# Patient Record
Sex: MALE | Race: WHITE | NOT HISPANIC OR LATINO | Employment: FULL TIME | URBAN - METROPOLITAN AREA
[De-identification: names, ages, dates, MRNs, and addresses within clinical notes are randomized per-mention and may not be internally consistent; named-entity substitution may affect disease eponyms.]

---

## 2024-06-25 ENCOUNTER — APPOINTMENT (OUTPATIENT)
Dept: ADMISSIONS | Facility: MEDICAL CENTER | Age: 39
End: 2024-06-25
Attending: SURGERY
Payer: COMMERCIAL

## 2024-06-28 ENCOUNTER — PRE-ADMISSION TESTING (OUTPATIENT)
Dept: ADMISSIONS | Facility: MEDICAL CENTER | Age: 39
End: 2024-06-28
Attending: SURGERY
Payer: COMMERCIAL

## 2024-06-28 VITALS — HEIGHT: 70 IN

## 2024-07-01 ENCOUNTER — ANESTHESIA EVENT (OUTPATIENT)
Dept: SURGERY | Facility: MEDICAL CENTER | Age: 39
End: 2024-07-01
Payer: COMMERCIAL

## 2024-07-01 ENCOUNTER — ANESTHESIA (OUTPATIENT)
Dept: SURGERY | Facility: MEDICAL CENTER | Age: 39
End: 2024-07-01
Payer: COMMERCIAL

## 2024-07-01 ENCOUNTER — HOSPITAL ENCOUNTER (OUTPATIENT)
Facility: MEDICAL CENTER | Age: 39
End: 2024-07-01
Attending: SURGERY | Admitting: SURGERY
Payer: COMMERCIAL

## 2024-07-01 VITALS
HEIGHT: 70 IN | BODY MASS INDEX: 24.78 KG/M2 | WEIGHT: 173.06 LBS | TEMPERATURE: 97.2 F | OXYGEN SATURATION: 94 % | RESPIRATION RATE: 18 BRPM | SYSTOLIC BLOOD PRESSURE: 153 MMHG | HEART RATE: 64 BPM | DIASTOLIC BLOOD PRESSURE: 95 MMHG

## 2024-07-01 DIAGNOSIS — G89.18 POSTOPERATIVE PAIN: ICD-10-CM

## 2024-07-01 LAB
ANION GAP SERPL CALC-SCNC: 13 MMOL/L (ref 7–16)
BUN SERPL-MCNC: 14 MG/DL (ref 8–22)
CALCIUM SERPL-MCNC: 9 MG/DL (ref 8.4–10.2)
CHLORIDE SERPL-SCNC: 99 MMOL/L (ref 96–112)
CO2 SERPL-SCNC: 21 MMOL/L (ref 20–33)
CREAT SERPL-MCNC: 0.79 MG/DL (ref 0.5–1.4)
ERYTHROCYTE [DISTWIDTH] IN BLOOD BY AUTOMATED COUNT: 35.3 FL (ref 35.9–50)
GFR SERPLBLD CREATININE-BSD FMLA CKD-EPI: 115 ML/MIN/1.73 M 2
GLUCOSE SERPL-MCNC: 279 MG/DL (ref 65–99)
HCT VFR BLD AUTO: 45.6 % (ref 42–52)
HGB BLD-MCNC: 16 G/DL (ref 14–18)
MCH RBC QN AUTO: 29.8 PG (ref 27–33)
MCHC RBC AUTO-ENTMCNC: 35.1 G/DL (ref 32.3–36.5)
MCV RBC AUTO: 84.9 FL (ref 81.4–97.8)
PLATELET # BLD AUTO: 306 K/UL (ref 164–446)
PMV BLD AUTO: 9 FL (ref 9–12.9)
POTASSIUM SERPL-SCNC: 4.2 MMOL/L (ref 3.6–5.5)
RBC # BLD AUTO: 5.37 M/UL (ref 4.7–6.1)
SODIUM SERPL-SCNC: 133 MMOL/L (ref 135–145)
WBC # BLD AUTO: 5.5 K/UL (ref 4.8–10.8)

## 2024-07-01 PROCEDURE — 160031 HCHG SURGERY MINUTES - 1ST 30 MINS LEVEL 5: Performed by: SURGERY

## 2024-07-01 PROCEDURE — 700111 HCHG RX REV CODE 636 W/ 250 OVERRIDE (IP): Performed by: ANESTHESIOLOGY

## 2024-07-01 PROCEDURE — 160002 HCHG RECOVERY MINUTES (STAT): Performed by: SURGERY

## 2024-07-01 PROCEDURE — 700101 HCHG RX REV CODE 250: Performed by: SURGERY

## 2024-07-01 PROCEDURE — 700111 HCHG RX REV CODE 636 W/ 250 OVERRIDE (IP): Mod: JZ | Performed by: ANESTHESIOLOGY

## 2024-07-01 PROCEDURE — 160025 RECOVERY II MINUTES (STATS): Performed by: SURGERY

## 2024-07-01 PROCEDURE — 160035 HCHG PACU - 1ST 60 MINS PHASE I: Performed by: SURGERY

## 2024-07-01 PROCEDURE — 700102 HCHG RX REV CODE 250 W/ 637 OVERRIDE(OP): Performed by: ANESTHESIOLOGY

## 2024-07-01 PROCEDURE — 85027 COMPLETE CBC AUTOMATED: CPT

## 2024-07-01 PROCEDURE — 700105 HCHG RX REV CODE 258: Performed by: ANESTHESIOLOGY

## 2024-07-01 PROCEDURE — 700101 HCHG RX REV CODE 250: Performed by: ANESTHESIOLOGY

## 2024-07-01 PROCEDURE — 36415 COLL VENOUS BLD VENIPUNCTURE: CPT

## 2024-07-01 PROCEDURE — 160046 HCHG PACU - 1ST 60 MINS PHASE II: Performed by: SURGERY

## 2024-07-01 PROCEDURE — 160042 HCHG SURGERY MINUTES - EA ADDL 1 MIN LEVEL 5: Performed by: SURGERY

## 2024-07-01 PROCEDURE — A9270 NON-COVERED ITEM OR SERVICE: HCPCS | Performed by: ANESTHESIOLOGY

## 2024-07-01 PROCEDURE — 160009 HCHG ANES TIME/MIN: Performed by: SURGERY

## 2024-07-01 PROCEDURE — 80048 BASIC METABOLIC PNL TOTAL CA: CPT

## 2024-07-01 PROCEDURE — C1781 MESH (IMPLANTABLE): HCPCS | Performed by: SURGERY

## 2024-07-01 PROCEDURE — 160036 HCHG PACU - EA ADDL 30 MINS PHASE I: Performed by: SURGERY

## 2024-07-01 PROCEDURE — 160048 HCHG OR STATISTICAL LEVEL 1-5: Performed by: SURGERY

## 2024-07-01 PROCEDURE — 502714 HCHG ROBOTIC SURGERY SERVICES: Performed by: SURGERY

## 2024-07-01 DEVICE — MESH PROGRIP LAPROSCOPIC SELF FIXATING (1/CA): Type: IMPLANTABLE DEVICE | Site: GROIN | Status: FUNCTIONAL

## 2024-07-01 RX ORDER — EPHEDRINE SULFATE 50 MG/ML
5 INJECTION, SOLUTION INTRAVENOUS
Status: DISCONTINUED | OUTPATIENT
Start: 2024-07-01 | End: 2024-07-01 | Stop reason: HOSPADM

## 2024-07-01 RX ORDER — KETOROLAC TROMETHAMINE 15 MG/ML
INJECTION, SOLUTION INTRAMUSCULAR; INTRAVENOUS PRN
Status: DISCONTINUED | OUTPATIENT
Start: 2024-07-01 | End: 2024-07-01 | Stop reason: SURG

## 2024-07-01 RX ORDER — HYDROMORPHONE HYDROCHLORIDE 2 MG/ML
INJECTION, SOLUTION INTRAMUSCULAR; INTRAVENOUS; SUBCUTANEOUS PRN
Status: DISCONTINUED | OUTPATIENT
Start: 2024-07-01 | End: 2024-07-01 | Stop reason: HOSPADM

## 2024-07-01 RX ORDER — HALOPERIDOL 5 MG/ML
1 INJECTION INTRAMUSCULAR
Status: DISCONTINUED | OUTPATIENT
Start: 2024-07-01 | End: 2024-07-01 | Stop reason: HOSPADM

## 2024-07-01 RX ORDER — OXYCODONE HYDROCHLORIDE 5 MG/1
5 TABLET ORAL EVERY 4 HOURS PRN
Qty: 12 TABLET | Refills: 0 | Status: SHIPPED | OUTPATIENT
Start: 2024-07-01 | End: 2024-07-04

## 2024-07-01 RX ORDER — OXYCODONE HCL 5 MG/5 ML
10 SOLUTION, ORAL ORAL
Status: COMPLETED | OUTPATIENT
Start: 2024-07-01 | End: 2024-07-01

## 2024-07-01 RX ORDER — HYDROMORPHONE HYDROCHLORIDE 1 MG/ML
0.2 INJECTION, SOLUTION INTRAMUSCULAR; INTRAVENOUS; SUBCUTANEOUS
Status: DISCONTINUED | OUTPATIENT
Start: 2024-07-01 | End: 2024-07-01 | Stop reason: HOSPADM

## 2024-07-01 RX ORDER — ONDANSETRON 2 MG/ML
4 INJECTION INTRAMUSCULAR; INTRAVENOUS
Status: DISCONTINUED | OUTPATIENT
Start: 2024-07-01 | End: 2024-07-01 | Stop reason: HOSPADM

## 2024-07-01 RX ORDER — POLYETHYLENE GLYCOL 3350 17 G/17G
17 POWDER, FOR SOLUTION ORAL DAILY
Qty: 20 EACH | Refills: 0 | Status: SHIPPED | OUTPATIENT
Start: 2024-07-01

## 2024-07-01 RX ORDER — HYDRALAZINE HYDROCHLORIDE 20 MG/ML
5 INJECTION INTRAMUSCULAR; INTRAVENOUS
Status: DISCONTINUED | OUTPATIENT
Start: 2024-07-01 | End: 2024-07-01 | Stop reason: HOSPADM

## 2024-07-01 RX ORDER — ACETAMINOPHEN 325 MG/1
650 TABLET ORAL EVERY 6 HOURS
Qty: 60 TABLET | Refills: 0 | Status: SHIPPED | OUTPATIENT
Start: 2024-07-01

## 2024-07-01 RX ORDER — DIPHENHYDRAMINE HYDROCHLORIDE 50 MG/ML
12.5 INJECTION INTRAMUSCULAR; INTRAVENOUS
Status: DISCONTINUED | OUTPATIENT
Start: 2024-07-01 | End: 2024-07-01 | Stop reason: HOSPADM

## 2024-07-01 RX ORDER — MEPERIDINE HYDROCHLORIDE 25 MG/ML
12.5 INJECTION INTRAMUSCULAR; INTRAVENOUS; SUBCUTANEOUS
Status: DISCONTINUED | OUTPATIENT
Start: 2024-07-01 | End: 2024-07-01 | Stop reason: HOSPADM

## 2024-07-01 RX ORDER — SODIUM CHLORIDE, SODIUM LACTATE, POTASSIUM CHLORIDE, CALCIUM CHLORIDE 600; 310; 30; 20 MG/100ML; MG/100ML; MG/100ML; MG/100ML
INJECTION, SOLUTION INTRAVENOUS
Status: DISCONTINUED | OUTPATIENT
Start: 2024-07-01 | End: 2024-07-01 | Stop reason: SURG

## 2024-07-01 RX ORDER — ONDANSETRON 2 MG/ML
INJECTION INTRAMUSCULAR; INTRAVENOUS PRN
Status: DISCONTINUED | OUTPATIENT
Start: 2024-07-01 | End: 2024-07-01 | Stop reason: SURG

## 2024-07-01 RX ORDER — OXYCODONE HCL 5 MG/5 ML
5 SOLUTION, ORAL ORAL
Status: COMPLETED | OUTPATIENT
Start: 2024-07-01 | End: 2024-07-01

## 2024-07-01 RX ORDER — LIDOCAINE HYDROCHLORIDE 40 MG/ML
SOLUTION TOPICAL PRN
Status: DISCONTINUED | OUTPATIENT
Start: 2024-07-01 | End: 2024-07-01 | Stop reason: SURG

## 2024-07-01 RX ORDER — MIDAZOLAM HYDROCHLORIDE 1 MG/ML
1 INJECTION INTRAMUSCULAR; INTRAVENOUS
Status: DISCONTINUED | OUTPATIENT
Start: 2024-07-01 | End: 2024-07-01 | Stop reason: HOSPADM

## 2024-07-01 RX ORDER — MIDAZOLAM HYDROCHLORIDE 1 MG/ML
INJECTION INTRAMUSCULAR; INTRAVENOUS PRN
Status: DISCONTINUED | OUTPATIENT
Start: 2024-07-01 | End: 2024-07-01 | Stop reason: SURG

## 2024-07-01 RX ORDER — IBUPROFEN 600 MG/1
600 TABLET ORAL EVERY 6 HOURS
Qty: 45 TABLET | Refills: 0 | Status: SHIPPED | OUTPATIENT
Start: 2024-07-01

## 2024-07-01 RX ORDER — CEFAZOLIN SODIUM 1 G/3ML
INJECTION, POWDER, FOR SOLUTION INTRAMUSCULAR; INTRAVENOUS PRN
Status: DISCONTINUED | OUTPATIENT
Start: 2024-07-01 | End: 2024-07-01 | Stop reason: SURG

## 2024-07-01 RX ORDER — SODIUM CHLORIDE, SODIUM LACTATE, POTASSIUM CHLORIDE, CALCIUM CHLORIDE 600; 310; 30; 20 MG/100ML; MG/100ML; MG/100ML; MG/100ML
INJECTION, SOLUTION INTRAVENOUS CONTINUOUS
Status: DISCONTINUED | OUTPATIENT
Start: 2024-07-01 | End: 2024-07-01 | Stop reason: HOSPADM

## 2024-07-01 RX ORDER — LABETALOL HYDROCHLORIDE 5 MG/ML
5 INJECTION, SOLUTION INTRAVENOUS
Status: DISCONTINUED | OUTPATIENT
Start: 2024-07-01 | End: 2024-07-01 | Stop reason: HOSPADM

## 2024-07-01 RX ORDER — HYDROMORPHONE HYDROCHLORIDE 1 MG/ML
0.4 INJECTION, SOLUTION INTRAMUSCULAR; INTRAVENOUS; SUBCUTANEOUS
Status: DISCONTINUED | OUTPATIENT
Start: 2024-07-01 | End: 2024-07-01 | Stop reason: HOSPADM

## 2024-07-01 RX ORDER — BUPIVACAINE HYDROCHLORIDE AND EPINEPHRINE 5; 5 MG/ML; UG/ML
INJECTION, SOLUTION PERINEURAL
Status: DISCONTINUED | OUTPATIENT
Start: 2024-07-01 | End: 2024-07-01 | Stop reason: HOSPADM

## 2024-07-01 RX ORDER — LIDOCAINE HYDROCHLORIDE 20 MG/ML
INJECTION, SOLUTION EPIDURAL; INFILTRATION; INTRACAUDAL; PERINEURAL PRN
Status: DISCONTINUED | OUTPATIENT
Start: 2024-07-01 | End: 2024-07-01 | Stop reason: SURG

## 2024-07-01 RX ORDER — HYDROMORPHONE HYDROCHLORIDE 1 MG/ML
0.5 INJECTION, SOLUTION INTRAMUSCULAR; INTRAVENOUS; SUBCUTANEOUS
Status: DISCONTINUED | OUTPATIENT
Start: 2024-07-01 | End: 2024-07-01 | Stop reason: HOSPADM

## 2024-07-01 RX ADMIN — MIDAZOLAM HYDROCHLORIDE 2 MG: 2 INJECTION, SOLUTION INTRAMUSCULAR; INTRAVENOUS at 11:46

## 2024-07-01 RX ADMIN — FENTANYL CITRATE 100 MCG: 50 INJECTION, SOLUTION INTRAMUSCULAR; INTRAVENOUS at 11:49

## 2024-07-01 RX ADMIN — SODIUM CHLORIDE, POTASSIUM CHLORIDE, SODIUM LACTATE AND CALCIUM CHLORIDE: 600; 310; 30; 20 INJECTION, SOLUTION INTRAVENOUS at 11:46

## 2024-07-01 RX ADMIN — HYDROMORPHONE HYDROCHLORIDE 0.5 MG: 1 INJECTION, SOLUTION INTRAMUSCULAR; INTRAVENOUS; SUBCUTANEOUS at 13:17

## 2024-07-01 RX ADMIN — ROCURONIUM BROMIDE 80 MG: 10 INJECTION, SOLUTION INTRAVENOUS at 11:49

## 2024-07-01 RX ADMIN — SODIUM CHLORIDE, POTASSIUM CHLORIDE, SODIUM LACTATE AND CALCIUM CHLORIDE: 600; 310; 30; 20 INJECTION, SOLUTION INTRAVENOUS at 12:45

## 2024-07-01 RX ADMIN — HYDROMORPHONE HYDROCHLORIDE 0.5 MG: 2 INJECTION INTRAMUSCULAR; INTRAVENOUS; SUBCUTANEOUS at 12:24

## 2024-07-01 RX ADMIN — OXYCODONE HYDROCHLORIDE 10 MG: 5 SOLUTION ORAL at 12:45

## 2024-07-01 RX ADMIN — SUGAMMADEX 200 MG: 100 INJECTION, SOLUTION INTRAVENOUS at 12:28

## 2024-07-01 RX ADMIN — LIDOCAINE HYDROCHLORIDE 4 ML: 40 SOLUTION TOPICAL at 11:53

## 2024-07-01 RX ADMIN — KETOROLAC TROMETHAMINE 15 MG: 15 INJECTION, SOLUTION INTRAMUSCULAR; INTRAVENOUS at 12:24

## 2024-07-01 RX ADMIN — PROPOFOL 200 MG: 10 INJECTION, EMULSION INTRAVENOUS at 11:49

## 2024-07-01 RX ADMIN — FENTANYL CITRATE 50 MCG: 50 INJECTION, SOLUTION INTRAMUSCULAR; INTRAVENOUS at 12:45

## 2024-07-01 RX ADMIN — FENTANYL CITRATE 50 MCG: 50 INJECTION, SOLUTION INTRAMUSCULAR; INTRAVENOUS at 12:54

## 2024-07-01 RX ADMIN — ONDANSETRON 4 MG: 2 INJECTION INTRAMUSCULAR; INTRAVENOUS at 12:24

## 2024-07-01 RX ADMIN — LIDOCAINE HYDROCHLORIDE 60 MG: 20 INJECTION, SOLUTION EPIDURAL; INFILTRATION; INTRACAUDAL at 11:49

## 2024-07-01 RX ADMIN — CEFAZOLIN 2 G: 1 INJECTION, POWDER, FOR SOLUTION INTRAMUSCULAR; INTRAVENOUS at 11:57

## 2024-07-01 RX ADMIN — HYDROMORPHONE HYDROCHLORIDE 0.5 MG: 2 INJECTION INTRAMUSCULAR; INTRAVENOUS; SUBCUTANEOUS at 12:01

## 2024-07-01 ASSESSMENT — PAIN DESCRIPTION - PAIN TYPE
TYPE: SURGICAL PAIN

## 2025-01-23 ENCOUNTER — OFFICE VISIT (OUTPATIENT)
Dept: URGENT CARE | Facility: PHYSICIAN GROUP | Age: 40
End: 2025-01-23
Payer: COMMERCIAL

## 2025-01-23 VITALS
TEMPERATURE: 98.1 F | SYSTOLIC BLOOD PRESSURE: 118 MMHG | HEART RATE: 82 BPM | RESPIRATION RATE: 14 BRPM | HEIGHT: 71 IN | DIASTOLIC BLOOD PRESSURE: 80 MMHG | WEIGHT: 172 LBS | OXYGEN SATURATION: 98 % | BODY MASS INDEX: 24.08 KG/M2

## 2025-01-23 DIAGNOSIS — H61.23 BILATERAL IMPACTED CERUMEN: ICD-10-CM

## 2025-01-23 DIAGNOSIS — J03.90 EXUDATIVE TONSILLITIS: ICD-10-CM

## 2025-01-23 PROCEDURE — 99203 OFFICE O/P NEW LOW 30 MIN: CPT | Mod: 25 | Performed by: PHYSICIAN ASSISTANT

## 2025-01-23 PROCEDURE — 69210 REMOVE IMPACTED EAR WAX UNI: CPT | Performed by: PHYSICIAN ASSISTANT

## 2025-01-23 PROCEDURE — 3079F DIAST BP 80-89 MM HG: CPT | Performed by: PHYSICIAN ASSISTANT

## 2025-01-23 PROCEDURE — 3074F SYST BP LT 130 MM HG: CPT | Performed by: PHYSICIAN ASSISTANT

## 2025-01-23 RX ORDER — AMOXICILLIN 500 MG/1
500 CAPSULE ORAL 2 TIMES DAILY
Qty: 20 CAPSULE | Refills: 0 | Status: SHIPPED | OUTPATIENT
Start: 2025-01-23 | End: 2025-02-02

## 2025-01-24 NOTE — PROGRESS NOTES
"Subjective:   Michael Vázquez is a 39 y.o. male who presents for Otalgia (P/t suspects ear infection after at home treatment /Sore throat /Both ears hurt /And throat feels swole /Itchy /)      HPI  The patient presents to the Urgent Care with complaints of a sore throat and ear pain.  Last week he tried to clean his ears with eardrops.  Gradual increase in ear fullness, pressure, and itching.  Some decreased hearing.  Started to develop a sore throat with radiation pain to his ears.  Hurts to swallow but handling oral secretions and tolerating fluids well.  He denies any fevers or chills.  Denies any cough or nasal symptoms.  No chest pain or shortness of breath.  No other complaints or concerns.      History reviewed. No pertinent past medical history.  No Known Allergies     Objective:     /80 (BP Location: Left arm, Patient Position: Sitting, BP Cuff Size: Adult)   Pulse 82   Temp 36.7 °C (98.1 °F) (Temporal)   Resp 14   Ht 1.803 m (5' 11\")   Wt 78 kg (172 lb)   SpO2 98%   BMI 23.99 kg/m²     Physical Exam  Vitals reviewed.   Constitutional:       General: He is not in acute distress.     Appearance: Normal appearance. He is not ill-appearing or toxic-appearing.   HENT:      Right Ear: External ear normal. There is impacted cerumen.      Left Ear: External ear normal. There is impacted cerumen.      Mouth/Throat:      Mouth: Mucous membranes are moist.      Pharynx: Uvula midline. Posterior oropharyngeal erythema present. No uvula swelling.      Tonsils: Tonsillar exudate present. No tonsillar abscesses. 1+ on the right. 1+ on the left.   Eyes:      Conjunctiva/sclera: Conjunctivae normal.   Cardiovascular:      Rate and Rhythm: Normal rate and regular rhythm.      Heart sounds: Normal heart sounds.   Pulmonary:      Effort: Pulmonary effort is normal.      Breath sounds: Normal breath sounds.   Musculoskeletal:      Cervical back: Neck supple. No rigidity.   Lymphadenopathy:      Cervical: " Cervical adenopathy present.      Right cervical: Superficial cervical adenopathy present.      Left cervical: Superficial cervical adenopathy present.   Skin:     General: Skin is warm and dry.   Neurological:      General: No focal deficit present.      Mental Status: He is alert and oriented to person, place, and time.   Psychiatric:         Mood and Affect: Mood normal.         Behavior: Behavior normal.       Ear Cerumen Removal Procedure    Indication: ear cerumen impaction bilaterally     Procedure: After placing the patient's head in the appropriate position, the patient's right ear was soaked with stool softener and canal was irrigated with the appropriate solution by MA followed by further removal using a curette by myself. Continued irrigation by MA until all cerumen was removed and the ear canal was clear.  The other ear canal also had cerumen present and same procedure used. Significant amount of cerumen removed. Re-evaluation of canals showed mild irritation and small abrasions bilaterally with normal TMs bilaterally. No signs of otitis media. No active bleeding at the conclusion of procedure.     The patient tolerated the procedure well.    Complications: None    Diagnosis and associated orders:     1. Exudative tonsillitis  - amoxicillin (AMOXIL) 500 MG Cap; Take 1 Capsule by mouth 2 times a day for 10 days.  Dispense: 20 Capsule; Refill: 0    2. Bilateral impacted cerumen       Comments/MDM:     Bilateral cerumen removal procedure as above.  Exudative tonsillitis.  Suspected bacterial etiology.  Start Amoxicillin.   Recommend warm salt water gargles, soft foods, cool liquids, ibuprofen and Tylenol for any pain or fevers.   Return to the urgent care if symptoms are not improving or any worsening symptoms or concerns. Present to the emergency room or call 911 if any severe swelling of the throat, difficulty swallowing, difficulty breathing, wheezing, or any other severe concerns.        I personally  reviewed prior external notes and test results pertinent to today's visit. Pathogenesis of diagnosis discussed including typical length and natural progression. Supportive care, natural history, differential diagnoses, and indications for immediate follow-up discussed. Patient expresses understanding and agrees to plan. Patient denies any other questions or concerns.     Follow-up with the primary care physician for recheck, reevaluation, and consideration of further management.    Please note that this dictation was created using voice recognition software. I have made a reasonable attempt to correct obvious errors, but I expect that there are errors of grammar and possibly content that I did not discover before finalizing the note.    This note was electronically signed by Cristofer Murillo PA-C

## 2025-03-18 ENCOUNTER — OFFICE VISIT (OUTPATIENT)
Dept: URGENT CARE | Facility: PHYSICIAN GROUP | Age: 40
End: 2025-03-18
Payer: COMMERCIAL

## 2025-03-18 ENCOUNTER — HOSPITAL ENCOUNTER (OUTPATIENT)
Dept: RADIOLOGY | Facility: MEDICAL CENTER | Age: 40
End: 2025-03-18
Attending: NURSE PRACTITIONER
Payer: COMMERCIAL

## 2025-03-18 VITALS
RESPIRATION RATE: 16 BRPM | TEMPERATURE: 98.9 F | SYSTOLIC BLOOD PRESSURE: 130 MMHG | BODY MASS INDEX: 23.8 KG/M2 | OXYGEN SATURATION: 96 % | HEIGHT: 71 IN | HEART RATE: 109 BPM | DIASTOLIC BLOOD PRESSURE: 68 MMHG | WEIGHT: 170 LBS

## 2025-03-18 DIAGNOSIS — J22 ACUTE LOWER RESPIRATORY INFECTION: ICD-10-CM

## 2025-03-18 DIAGNOSIS — R05.9 COUGH, UNSPECIFIED TYPE: ICD-10-CM

## 2025-03-18 PROCEDURE — 71046 X-RAY EXAM CHEST 2 VIEWS: CPT

## 2025-03-18 PROCEDURE — 3075F SYST BP GE 130 - 139MM HG: CPT | Performed by: NURSE PRACTITIONER

## 2025-03-18 PROCEDURE — 3078F DIAST BP <80 MM HG: CPT | Performed by: NURSE PRACTITIONER

## 2025-03-18 PROCEDURE — 99213 OFFICE O/P EST LOW 20 MIN: CPT | Performed by: NURSE PRACTITIONER

## 2025-03-18 RX ORDER — PREDNISONE 20 MG/1
TABLET ORAL
Qty: 10 TABLET | Refills: 0 | Status: ON HOLD | OUTPATIENT
Start: 2025-03-18 | End: 2025-03-23

## 2025-03-18 RX ORDER — DOXYCYCLINE 100 MG/1
100 CAPSULE ORAL 2 TIMES DAILY
Qty: 14 CAPSULE | Refills: 0 | Status: ON HOLD | OUTPATIENT
Start: 2025-03-18 | End: 2025-03-23

## 2025-03-18 ASSESSMENT — ENCOUNTER SYMPTOMS
MYALGIAS: 0
DIARRHEA: 0
SPUTUM PRODUCTION: 1
COUGH: 1
NAUSEA: 0
HEADACHES: 0
SORE THROAT: 0
ORTHOPNEA: 0
EYE DISCHARGE: 0
SHORTNESS OF BREATH: 0
FEVER: 0
CHILLS: 0
WHEEZING: 0

## 2025-03-19 NOTE — PROGRESS NOTES
Subjective     Michael Vázquez is a 39 y.o. male who presents with Other (X 1 wk Chest congestion, hard time breathing, green mucus)            HPI  New problem.  Patient is a 39-year-old male who presents with 1 week of chest congestion, shortness of breath with coughing and green mucus.  He reports some discomfort with his cough across his anterior upper chest area.  When he does not cough or take a deep breath he does not have any chest pain.  He denies fever, chills, myalgia.  He has not taken any medications for this issue.    Patient has no known allergies.  No current outpatient medications on file prior to visit.     No current facility-administered medications on file prior to visit.     Social History     Socioeconomic History    Marital status:      Spouse name: Not on file    Number of children: Not on file    Years of education: Not on file    Highest education level: Not on file   Occupational History    Not on file   Tobacco Use    Smoking status: Never    Smokeless tobacco: Never   Vaping Use    Vaping status: Never Used   Substance and Sexual Activity    Alcohol use: Not Currently     Alcohol/week: 0.6 - 1.2 oz     Types: 1 - 2 Glasses of wine per week    Drug use: No    Sexual activity: Not on file   Other Topics Concern    Not on file   Social History Narrative    Not on file     Social Drivers of Health     Financial Resource Strain: Not on file   Food Insecurity: Not on file   Transportation Needs: Not on file   Physical Activity: Not on file   Stress: Not on file   Social Connections: Not on file   Intimate Partner Violence: Not on file   Housing Stability: Not on file     Breast Cancer-related family history is not on file.      Review of Systems   Constitutional:  Positive for malaise/fatigue. Negative for chills and fever.   HENT:  Negative for congestion and sore throat.    Eyes:  Negative for discharge.   Respiratory:  Positive for cough and sputum production. Negative for  "shortness of breath and wheezing.    Cardiovascular:  Positive for chest pain. Negative for orthopnea.        Chest pain with cough   Gastrointestinal:  Negative for diarrhea and nausea.   Musculoskeletal:  Negative for myalgias.   Neurological:  Negative for headaches.   Endo/Heme/Allergies:  Negative for environmental allergies.              Objective     /68   Pulse (!) 109   Temp 37.2 °C (98.9 °F)   Resp 16   Ht 1.791 m (5' 10.5\")   Wt 77.1 kg (170 lb)   SpO2 96%   BMI 24.05 kg/m²      Physical Exam  Vitals and nursing note reviewed.   Constitutional:       General: He is not in acute distress.     Appearance: Normal appearance. He is well-developed. He is not ill-appearing.   HENT:      Head: Normocephalic.      Right Ear: Tympanic membrane and external ear normal.      Left Ear: Tympanic membrane and external ear normal.      Nose: Mucosal edema and rhinorrhea present.      Mouth/Throat:      Pharynx: No posterior oropharyngeal erythema.   Eyes:      General:         Right eye: No discharge.         Left eye: No discharge.      Conjunctiva/sclera: Conjunctivae normal.   Cardiovascular:      Rate and Rhythm: Regular rhythm. Tachycardia present.      Heart sounds: Normal heart sounds.   Pulmonary:      Effort: Pulmonary effort is normal.      Breath sounds: Normal breath sounds. No wheezing, rhonchi or rales.   Musculoskeletal:         General: Normal range of motion.      Cervical back: Normal range of motion and neck supple.   Lymphadenopathy:      Cervical: No cervical adenopathy.   Skin:     General: Skin is warm and dry.   Neurological:      Mental Status: He is alert and oriented to person, place, and time.   Psychiatric:         Behavior: Behavior normal.         Thought Content: Thought content normal.                                  Assessment & Plan       1. Acute lower respiratory infection  doxycycline (VIBRAMYCIN) 100 MG Cap    predniSONE (DELTASONE) 20 MG Tab      2. Cough, " unspecified type  DX-CHEST-2 VIEWS        X-ray pending- lateral view looks ok but cannot see PA.  Doxy/steroid  Mucinex.  Differential diagnosis, natural history, supportive care, and indications for immediate follow-up were discussed.

## 2025-03-20 ENCOUNTER — HOSPITAL ENCOUNTER (INPATIENT)
Facility: MEDICAL CENTER | Age: 40
LOS: 3 days | DRG: 638 | End: 2025-03-23
Attending: EMERGENCY MEDICINE | Admitting: INTERNAL MEDICINE
Payer: COMMERCIAL

## 2025-03-20 ENCOUNTER — APPOINTMENT (OUTPATIENT)
Dept: RADIOLOGY | Facility: MEDICAL CENTER | Age: 40
DRG: 638 | End: 2025-03-20
Attending: EMERGENCY MEDICINE
Payer: COMMERCIAL

## 2025-03-20 DIAGNOSIS — E10.10 DKA, TYPE 1, NOT AT GOAL (HCC): Chronic | ICD-10-CM

## 2025-03-20 DIAGNOSIS — J10.1 INFLUENZA A: ICD-10-CM

## 2025-03-20 DIAGNOSIS — E10.10 DIABETIC KETOACIDOSIS WITHOUT COMA ASSOCIATED WITH TYPE 1 DIABETES MELLITUS (HCC): ICD-10-CM

## 2025-03-20 PROBLEM — D72.829 LEUKOCYTOSIS: Status: ACTIVE | Noted: 2025-03-20

## 2025-03-20 PROBLEM — D75.1 POLYCYTHEMIA: Status: ACTIVE | Noted: 2025-03-20

## 2025-03-20 PROBLEM — N17.9 ACUTE KIDNEY INJURY (HCC): Status: ACTIVE | Noted: 2025-03-20

## 2025-03-20 PROBLEM — K59.00 CONSTIPATION: Status: ACTIVE | Noted: 2025-03-20

## 2025-03-20 LAB
ALBUMIN SERPL BCP-MCNC: 3.8 G/DL (ref 3.2–4.9)
ALBUMIN SERPL BCP-MCNC: 4.7 G/DL (ref 3.2–4.9)
ALBUMIN/GLOB SERPL: 1 G/DL
ALBUMIN/GLOB SERPL: 1 G/DL
ALP SERPL-CCNC: 119 U/L (ref 30–99)
ALP SERPL-CCNC: 146 U/L (ref 30–99)
ALT SERPL-CCNC: 13 U/L (ref 2–50)
ALT SERPL-CCNC: 16 U/L (ref 2–50)
ANION GAP SERPL CALC-SCNC: 24 MMOL/L (ref 7–16)
ANION GAP SERPL CALC-SCNC: 31 MMOL/L (ref 7–16)
APPEARANCE UR: CLEAR
AST SERPL-CCNC: 13 U/L (ref 12–45)
AST SERPL-CCNC: 25 U/L (ref 12–45)
B-OH-BUTYR SERPL-MCNC: 8.95 MMOL/L (ref 0.02–0.27)
BACTERIA #/AREA URNS HPF: ABNORMAL /HPF
BASE EXCESS BLDV CALC-SCNC: -22 MMOL/L (ref -2–3)
BASOPHILS # BLD AUTO: 0 % (ref 0–1.8)
BASOPHILS # BLD AUTO: 0.5 % (ref 0–1.8)
BASOPHILS # BLD: 0 K/UL (ref 0–0.12)
BASOPHILS # BLD: 0.05 K/UL (ref 0–0.12)
BILIRUB SERPL-MCNC: 0.3 MG/DL (ref 0.1–1.5)
BILIRUB SERPL-MCNC: 0.5 MG/DL (ref 0.1–1.5)
BILIRUB UR QL STRIP.AUTO: NEGATIVE
BODY TEMPERATURE: 36.6 CENTIGRADE
BUN SERPL-MCNC: 19 MG/DL (ref 8–22)
BUN SERPL-MCNC: 21 MG/DL (ref 8–22)
CALCIUM ALBUM COR SERPL-MCNC: 9 MG/DL (ref 8.5–10.5)
CALCIUM ALBUM COR SERPL-MCNC: 9.7 MG/DL (ref 8.5–10.5)
CALCIUM SERPL-MCNC: 10.3 MG/DL (ref 8.5–10.5)
CALCIUM SERPL-MCNC: 8.8 MG/DL (ref 8.5–10.5)
CASTS URNS QL MICRO: ABNORMAL /LPF (ref 0–2)
CHLORIDE SERPL-SCNC: 108 MMOL/L (ref 96–112)
CHLORIDE SERPL-SCNC: 98 MMOL/L (ref 96–112)
CO2 SERPL-SCNC: 4 MMOL/L (ref 20–33)
CO2 SERPL-SCNC: 6 MMOL/L (ref 20–33)
COLOR UR: YELLOW
CREAT SERPL-MCNC: 1.17 MG/DL (ref 0.5–1.4)
CREAT SERPL-MCNC: 1.46 MG/DL (ref 0.5–1.4)
EOSINOPHIL # BLD AUTO: 0 K/UL (ref 0–0.51)
EOSINOPHIL # BLD AUTO: 0 K/UL (ref 0–0.51)
EOSINOPHIL NFR BLD: 0 % (ref 0–6.9)
EOSINOPHIL NFR BLD: 0 % (ref 0–6.9)
EPITHELIAL CELLS 1715: ABNORMAL /HPF (ref 0–5)
ERYTHROCYTE [DISTWIDTH] IN BLOOD BY AUTOMATED COUNT: 38.6 FL (ref 35.9–50)
ERYTHROCYTE [DISTWIDTH] IN BLOOD BY AUTOMATED COUNT: 39.6 FL (ref 35.9–50)
EST. AVERAGE GLUCOSE BLD GHB EST-MCNC: 349 MG/DL
FLUAV RNA SPEC QL NAA+PROBE: POSITIVE
FLUBV RNA SPEC QL NAA+PROBE: NEGATIVE
GFR SERPLBLD CREATININE-BSD FMLA CKD-EPI: 61 ML/MIN/1.73 M 2
GFR SERPLBLD CREATININE-BSD FMLA CKD-EPI: 80 ML/MIN/1.73 M 2
GLOBULIN SER CALC-MCNC: 3.8 G/DL (ref 1.9–3.5)
GLOBULIN SER CALC-MCNC: 4.5 G/DL (ref 1.9–3.5)
GLUCOSE BLD STRIP.AUTO-MCNC: 416 MG/DL (ref 65–99)
GLUCOSE BLD STRIP.AUTO-MCNC: 437 MG/DL (ref 65–99)
GLUCOSE BLD STRIP.AUTO-MCNC: 500 MG/DL (ref 65–99)
GLUCOSE SERPL-MCNC: 338 MG/DL (ref 65–99)
GLUCOSE SERPL-MCNC: 520 MG/DL (ref 65–99)
GLUCOSE UR STRIP.AUTO-MCNC: >=1000 MG/DL
HBA1C MFR BLD: 13.8 % (ref 4–5.6)
HCO3 BLDV-SCNC: 7 MMOL/L (ref 22–29)
HCT VFR BLD AUTO: 48.7 % (ref 42–52)
HCT VFR BLD AUTO: 53.5 % (ref 42–52)
HGB BLD-MCNC: 16.2 G/DL (ref 14–18)
HGB BLD-MCNC: 18.5 G/DL (ref 14–18)
HYALINE CAST   1831: PRESENT /LPF
IMM GRANULOCYTES # BLD AUTO: 0.18 K/UL (ref 0–0.11)
IMM GRANULOCYTES NFR BLD AUTO: 1.8 % (ref 0–0.9)
KETONES UR STRIP.AUTO-MCNC: >=160 MG/DL
LEUKOCYTE ESTERASE UR QL STRIP.AUTO: NEGATIVE
LIPASE SERPL-CCNC: 39 U/L (ref 11–82)
LYMPHOCYTES # BLD AUTO: 0.86 K/UL (ref 1–4.8)
LYMPHOCYTES # BLD AUTO: 0.89 K/UL (ref 1–4.8)
LYMPHOCYTES NFR BLD: 7.2 % (ref 22–41)
LYMPHOCYTES NFR BLD: 8.6 % (ref 22–41)
MAGNESIUM SERPL-MCNC: 2.2 MG/DL (ref 1.5–2.5)
MAGNESIUM SERPL-MCNC: 2.3 MG/DL (ref 1.5–2.5)
MANUAL DIFF BLD: NORMAL
MCH RBC QN AUTO: 29.5 PG (ref 27–33)
MCH RBC QN AUTO: 29.6 PG (ref 27–33)
MCHC RBC AUTO-ENTMCNC: 33.3 G/DL (ref 32.3–36.5)
MCHC RBC AUTO-ENTMCNC: 34.6 G/DL (ref 32.3–36.5)
MCV RBC AUTO: 85.6 FL (ref 81.4–97.8)
MCV RBC AUTO: 88.5 FL (ref 81.4–97.8)
METAMYELOCYTES NFR BLD MANUAL: 0.9 %
MICRO URNS: ABNORMAL
MONOCYTES # BLD AUTO: 0.11 K/UL (ref 0–0.85)
MONOCYTES # BLD AUTO: 0.28 K/UL (ref 0–0.85)
MONOCYTES NFR BLD AUTO: 0.9 % (ref 0–13.4)
MONOCYTES NFR BLD AUTO: 2.8 % (ref 0–13.4)
MORPHOLOGY BLD-IMP: NORMAL
MUCOUS THREADS URNS QL MICRO: PRESENT /HPF
NEUTROPHILS # BLD AUTO: 11.19 K/UL (ref 1.82–7.42)
NEUTROPHILS # BLD AUTO: 8.62 K/UL (ref 1.82–7.42)
NEUTROPHILS NFR BLD: 86.3 % (ref 44–72)
NEUTROPHILS NFR BLD: 91 % (ref 44–72)
NITRITE UR QL STRIP.AUTO: NEGATIVE
NRBC # BLD AUTO: 0 K/UL
NRBC # BLD AUTO: 0 K/UL
NRBC BLD-RTO: 0 /100 WBC (ref 0–0.2)
NRBC BLD-RTO: 0 /100 WBC (ref 0–0.2)
PCO2 BLDV: 25.2 MMHG (ref 38–54)
PCO2 TEMP ADJ BLDV: 24.8 MMHG (ref 38–54)
PH BLDV: 7.06 [PH] (ref 7.31–7.45)
PH TEMP ADJ BLDV: 7.06 [PH] (ref 7.31–7.45)
PH UR STRIP.AUTO: 5 [PH] (ref 5–8)
PHOSPHATE SERPL-MCNC: 1.6 MG/DL (ref 2.5–4.5)
PHOSPHATE SERPL-MCNC: 3.6 MG/DL (ref 2.5–4.5)
PLATELET # BLD AUTO: 334 K/UL (ref 164–446)
PLATELET # BLD AUTO: 459 K/UL (ref 164–446)
PLATELET BLD QL SMEAR: NORMAL
PMV BLD AUTO: 8.7 FL (ref 9–12.9)
PMV BLD AUTO: 9 FL (ref 9–12.9)
PO2 BLDV: 35.1 MMHG (ref 23–48)
PO2 TEMP ADJ BLDV: 34.1 MMHG (ref 23–48)
POTASSIUM SERPL-SCNC: 4.7 MMOL/L (ref 3.6–5.5)
POTASSIUM SERPL-SCNC: 4.8 MMOL/L (ref 3.6–5.5)
PROCALCITONIN SERPL-MCNC: 0.09 NG/ML
PROT SERPL-MCNC: 7.6 G/DL (ref 6–8.2)
PROT SERPL-MCNC: 9.2 G/DL (ref 6–8.2)
PROT UR QL STRIP: 100 MG/DL
RBC # BLD AUTO: 5.5 M/UL (ref 4.7–6.1)
RBC # BLD AUTO: 6.25 M/UL (ref 4.7–6.1)
RBC # URNS HPF: ABNORMAL /HPF (ref 0–2)
RBC BLD AUTO: NORMAL
RBC UR QL AUTO: ABNORMAL
RSV RNA SPEC QL NAA+PROBE: NEGATIVE
SAO2 % BLDV: 64 % (ref 60–85)
SARS-COV-2 RNA RESP QL NAA+PROBE: NOTDETECTED
SODIUM SERPL-SCNC: 135 MMOL/L (ref 135–145)
SODIUM SERPL-SCNC: 136 MMOL/L (ref 135–145)
SP GR UR STRIP.AUTO: 1.03
UROBILINOGEN UR STRIP.AUTO-MCNC: 0.2 EU/DL
WBC # BLD AUTO: 10 K/UL (ref 4.8–10.8)
WBC # BLD AUTO: 12.3 K/UL (ref 4.8–10.8)
WBC #/AREA URNS HPF: ABNORMAL /HPF

## 2025-03-20 PROCEDURE — 96365 THER/PROPH/DIAG IV INF INIT: CPT

## 2025-03-20 PROCEDURE — 82010 KETONE BODYS QUAN: CPT

## 2025-03-20 PROCEDURE — 700102 HCHG RX REV CODE 250 W/ 637 OVERRIDE(OP)

## 2025-03-20 PROCEDURE — 700111 HCHG RX REV CODE 636 W/ 250 OVERRIDE (IP): Mod: JZ | Performed by: EMERGENCY MEDICINE

## 2025-03-20 PROCEDURE — 83036 HEMOGLOBIN GLYCOSYLATED A1C: CPT

## 2025-03-20 PROCEDURE — 83605 ASSAY OF LACTIC ACID: CPT

## 2025-03-20 PROCEDURE — 80053 COMPREHEN METABOLIC PANEL: CPT | Mod: 91

## 2025-03-20 PROCEDURE — 85027 COMPLETE CBC AUTOMATED: CPT

## 2025-03-20 PROCEDURE — 85025 COMPLETE CBC W/AUTO DIFF WBC: CPT

## 2025-03-20 PROCEDURE — 0241U HCHG SARS-COV-2 COVID-19 NFCT DS RESP RNA 4 TRGT ED POC: CPT

## 2025-03-20 PROCEDURE — 700105 HCHG RX REV CODE 258: Performed by: EMERGENCY MEDICINE

## 2025-03-20 PROCEDURE — A9270 NON-COVERED ITEM OR SERVICE: HCPCS

## 2025-03-20 PROCEDURE — 83690 ASSAY OF LIPASE: CPT

## 2025-03-20 PROCEDURE — 85007 BL SMEAR W/DIFF WBC COUNT: CPT

## 2025-03-20 PROCEDURE — 700102 HCHG RX REV CODE 250 W/ 637 OVERRIDE(OP): Performed by: EMERGENCY MEDICINE

## 2025-03-20 PROCEDURE — 99291 CRITICAL CARE FIRST HOUR: CPT

## 2025-03-20 PROCEDURE — 82962 GLUCOSE BLOOD TEST: CPT | Mod: 91

## 2025-03-20 PROCEDURE — 96375 TX/PRO/DX INJ NEW DRUG ADDON: CPT

## 2025-03-20 PROCEDURE — 81001 URINALYSIS AUTO W/SCOPE: CPT

## 2025-03-20 PROCEDURE — 84681 ASSAY OF C-PEPTIDE: CPT

## 2025-03-20 PROCEDURE — 700105 HCHG RX REV CODE 258

## 2025-03-20 PROCEDURE — 86337 INSULIN ANTIBODIES: CPT

## 2025-03-20 PROCEDURE — 99291 CRITICAL CARE FIRST HOUR: CPT | Mod: GC | Performed by: INTERNAL MEDICINE

## 2025-03-20 PROCEDURE — 71045 X-RAY EXAM CHEST 1 VIEW: CPT

## 2025-03-20 PROCEDURE — 86341 ISLET CELL ANTIBODY: CPT

## 2025-03-20 PROCEDURE — 84100 ASSAY OF PHOSPHORUS: CPT | Mod: 91

## 2025-03-20 PROCEDURE — 83735 ASSAY OF MAGNESIUM: CPT | Mod: 91

## 2025-03-20 PROCEDURE — A9270 NON-COVERED ITEM OR SERVICE: HCPCS | Performed by: EMERGENCY MEDICINE

## 2025-03-20 PROCEDURE — 770022 HCHG ROOM/CARE - ICU (200)

## 2025-03-20 PROCEDURE — 36415 COLL VENOUS BLD VENIPUNCTURE: CPT

## 2025-03-20 PROCEDURE — 82803 BLOOD GASES ANY COMBINATION: CPT | Mod: 91

## 2025-03-20 PROCEDURE — 84145 PROCALCITONIN (PCT): CPT

## 2025-03-20 PROCEDURE — 700111 HCHG RX REV CODE 636 W/ 250 OVERRIDE (IP)

## 2025-03-20 RX ORDER — MAGNESIUM SULFATE HEPTAHYDRATE 40 MG/ML
2 INJECTION, SOLUTION INTRAVENOUS
Status: COMPLETED | OUTPATIENT
Start: 2025-03-20 | End: 2025-03-22

## 2025-03-20 RX ORDER — POLYETHYLENE GLYCOL 3350 17 G/17G
1 POWDER, FOR SOLUTION ORAL
Status: DISCONTINUED | OUTPATIENT
Start: 2025-03-20 | End: 2025-03-23 | Stop reason: HOSPADM

## 2025-03-20 RX ORDER — SODIUM CHLORIDE, SODIUM LACTATE, POTASSIUM CHLORIDE, AND CALCIUM CHLORIDE .6; .31; .03; .02 G/100ML; G/100ML; G/100ML; G/100ML
2000 INJECTION, SOLUTION INTRAVENOUS ONCE
Status: DISCONTINUED | OUTPATIENT
Start: 2025-03-20 | End: 2025-03-20

## 2025-03-20 RX ORDER — ONDANSETRON 2 MG/ML
4 INJECTION INTRAMUSCULAR; INTRAVENOUS ONCE
Status: COMPLETED | OUTPATIENT
Start: 2025-03-20 | End: 2025-03-20

## 2025-03-20 RX ORDER — ACETAMINOPHEN 325 MG/1
650 TABLET ORAL EVERY 6 HOURS PRN
Status: DISCONTINUED | OUTPATIENT
Start: 2025-03-20 | End: 2025-03-23 | Stop reason: HOSPADM

## 2025-03-20 RX ORDER — HEPARIN SODIUM 5000 [USP'U]/ML
5000 INJECTION, SOLUTION INTRAVENOUS; SUBCUTANEOUS EVERY 8 HOURS
Status: DISCONTINUED | OUTPATIENT
Start: 2025-03-20 | End: 2025-03-21

## 2025-03-20 RX ORDER — DEXTROSE AND SODIUM CHLORIDE 10; .45 G/100ML; G/100ML
INJECTION, SOLUTION INTRAVENOUS CONTINUOUS
Status: DISCONTINUED | OUTPATIENT
Start: 2025-03-20 | End: 2025-03-22

## 2025-03-20 RX ORDER — POLYETHYLENE GLYCOL 3350 17 G/17G
1 POWDER, FOR SOLUTION ORAL PRN
Status: DISCONTINUED | OUTPATIENT
Start: 2025-03-20 | End: 2025-03-20

## 2025-03-20 RX ORDER — POTASSIUM CHLORIDE 7.45 MG/ML
10 INJECTION INTRAVENOUS ONCE
Status: COMPLETED | OUTPATIENT
Start: 2025-03-20 | End: 2025-03-20

## 2025-03-20 RX ORDER — PROMETHAZINE HYDROCHLORIDE 25 MG/1
12.5-25 TABLET ORAL EVERY 4 HOURS PRN
Status: DISCONTINUED | OUTPATIENT
Start: 2025-03-20 | End: 2025-03-23 | Stop reason: HOSPADM

## 2025-03-20 RX ORDER — ONDANSETRON 2 MG/ML
4 INJECTION INTRAMUSCULAR; INTRAVENOUS EVERY 4 HOURS PRN
Status: DISCONTINUED | OUTPATIENT
Start: 2025-03-20 | End: 2025-03-23 | Stop reason: HOSPADM

## 2025-03-20 RX ORDER — MAGNESIUM SULFATE HEPTAHYDRATE 40 MG/ML
4 INJECTION, SOLUTION INTRAVENOUS
Status: COMPLETED | OUTPATIENT
Start: 2025-03-20 | End: 2025-03-22

## 2025-03-20 RX ORDER — AMOXICILLIN 250 MG
2 CAPSULE ORAL EVERY EVENING
Status: DISCONTINUED | OUTPATIENT
Start: 2025-03-21 | End: 2025-03-23 | Stop reason: HOSPADM

## 2025-03-20 RX ORDER — SODIUM CHLORIDE 9 MG/ML
1000 INJECTION, SOLUTION INTRAVENOUS ONCE
Status: COMPLETED | OUTPATIENT
Start: 2025-03-20 | End: 2025-03-20

## 2025-03-20 RX ORDER — BROMPHENIRAMINE MALEATE, PSEUDOEPHEDRINE HYDROCHLORIDE, AND DEXTROMETHORPHAN HYDROBROMIDE 2; 30; 10 MG/5ML; MG/5ML; MG/5ML
SYRUP ORAL
Status: ON HOLD | COMMUNITY
Start: 2025-03-18 | End: 2025-03-23

## 2025-03-20 RX ORDER — PROMETHAZINE HYDROCHLORIDE 25 MG/1
12.5-25 SUPPOSITORY RECTAL EVERY 4 HOURS PRN
Status: DISCONTINUED | OUTPATIENT
Start: 2025-03-20 | End: 2025-03-23 | Stop reason: HOSPADM

## 2025-03-20 RX ORDER — OSELTAMIVIR PHOSPHATE 75 MG/1
75 CAPSULE ORAL ONCE
Status: COMPLETED | OUTPATIENT
Start: 2025-03-20 | End: 2025-03-20

## 2025-03-20 RX ORDER — ONDANSETRON 4 MG/1
4 TABLET, ORALLY DISINTEGRATING ORAL EVERY 4 HOURS PRN
Status: DISCONTINUED | OUTPATIENT
Start: 2025-03-20 | End: 2025-03-23 | Stop reason: HOSPADM

## 2025-03-20 RX ORDER — OSELTAMIVIR PHOSPHATE 75 MG/1
75 CAPSULE ORAL 2 TIMES DAILY
Status: DISCONTINUED | OUTPATIENT
Start: 2025-03-21 | End: 2025-03-23 | Stop reason: HOSPADM

## 2025-03-20 RX ORDER — SODIUM CHLORIDE, SODIUM LACTATE, POTASSIUM CHLORIDE, CALCIUM CHLORIDE 600; 310; 30; 20 MG/100ML; MG/100ML; MG/100ML; MG/100ML
INJECTION, SOLUTION INTRAVENOUS CONTINUOUS
Status: DISCONTINUED | OUTPATIENT
Start: 2025-03-20 | End: 2025-03-22

## 2025-03-20 RX ORDER — PROCHLORPERAZINE EDISYLATE 5 MG/ML
5-10 INJECTION INTRAMUSCULAR; INTRAVENOUS EVERY 4 HOURS PRN
Status: DISCONTINUED | OUTPATIENT
Start: 2025-03-20 | End: 2025-03-23 | Stop reason: HOSPADM

## 2025-03-20 RX ADMIN — SODIUM CHLORIDE 1000 ML: 9 INJECTION, SOLUTION INTRAVENOUS at 18:55

## 2025-03-20 RX ADMIN — SODIUM CHLORIDE, POTASSIUM CHLORIDE, SODIUM LACTATE AND CALCIUM CHLORIDE: 600; 310; 30; 20 INJECTION, SOLUTION INTRAVENOUS at 22:09

## 2025-03-20 RX ADMIN — SODIUM CHLORIDE 5 UNITS/HR: 9 INJECTION, SOLUTION INTRAVENOUS at 20:38

## 2025-03-20 RX ADMIN — ACETAMINOPHEN 650 MG: 325 TABLET ORAL at 22:19

## 2025-03-20 RX ADMIN — SODIUM CHLORIDE 1000 ML: 9 INJECTION, SOLUTION INTRAVENOUS at 18:54

## 2025-03-20 RX ADMIN — SODIUM CHLORIDE 7 UNITS/HR: 9 INJECTION, SOLUTION INTRAVENOUS at 21:47

## 2025-03-20 RX ADMIN — HEPARIN SODIUM 5000 UNITS: 5000 INJECTION, SOLUTION INTRAVENOUS; SUBCUTANEOUS at 22:20

## 2025-03-20 RX ADMIN — ONDANSETRON 4 MG: 2 INJECTION INTRAMUSCULAR; INTRAVENOUS at 19:01

## 2025-03-20 RX ADMIN — INSULIN HUMAN 5 UNITS: 100 INJECTION, SOLUTION PARENTERAL at 20:17

## 2025-03-20 RX ADMIN — OSELTAMIVIR PHOSPHATE 75 MG: 75 CAPSULE ORAL at 20:08

## 2025-03-20 RX ADMIN — POTASSIUM CHLORIDE 10 MEQ: 7.46 INJECTION, SOLUTION INTRAVENOUS at 22:30

## 2025-03-20 ASSESSMENT — PAIN DESCRIPTION - PAIN TYPE
TYPE: ACUTE PAIN
TYPE: ACUTE PAIN

## 2025-03-20 ASSESSMENT — FIBROSIS 4 INDEX: FIB4 SCORE: 0.4

## 2025-03-20 ASSESSMENT — ENCOUNTER SYMPTOMS
VOMITING: 1
NAUSEA: 1
ABDOMINAL PAIN: 0
SHORTNESS OF BREATH: 1
CONSTIPATION: 1
COUGH: 0
CHILLS: 1
HEADACHES: 1
DIARRHEA: 0
WEAKNESS: 1
FEVER: 1

## 2025-03-21 PROBLEM — R51.9 GENERALIZED HEADACHES: Status: ACTIVE | Noted: 2025-03-21

## 2025-03-21 LAB
ANION GAP SERPL CALC-SCNC: 10 MMOL/L (ref 7–16)
ANION GAP SERPL CALC-SCNC: 12 MMOL/L (ref 7–16)
ANION GAP SERPL CALC-SCNC: 13 MMOL/L (ref 7–16)
ANION GAP SERPL CALC-SCNC: 15 MMOL/L (ref 7–16)
ANION GAP SERPL CALC-SCNC: 8 MMOL/L (ref 7–16)
BASE EXCESS BLDV CALC-SCNC: -18 MMOL/L (ref -2–3)
BODY TEMPERATURE: ABNORMAL DEGREES
BUN SERPL-MCNC: 16 MG/DL (ref 8–22)
BUN SERPL-MCNC: 18 MG/DL (ref 8–22)
BUN SERPL-MCNC: 19 MG/DL (ref 8–22)
CALCIUM SERPL-MCNC: 7.9 MG/DL (ref 8.5–10.5)
CALCIUM SERPL-MCNC: 8.2 MG/DL (ref 8.5–10.5)
CALCIUM SERPL-MCNC: 8.3 MG/DL (ref 8.5–10.5)
CALCIUM SERPL-MCNC: 8.4 MG/DL (ref 8.5–10.5)
CALCIUM SERPL-MCNC: 8.4 MG/DL (ref 8.5–10.5)
CHLORIDE SERPL-SCNC: 111 MMOL/L (ref 96–112)
CHLORIDE SERPL-SCNC: 112 MMOL/L (ref 96–112)
CHLORIDE SERPL-SCNC: 113 MMOL/L (ref 96–112)
CHLORIDE SERPL-SCNC: 114 MMOL/L (ref 96–112)
CHLORIDE SERPL-SCNC: 114 MMOL/L (ref 96–112)
CO2 BLDV-SCNC: 9 MMOL/L (ref 20–33)
CO2 SERPL-SCNC: 11 MMOL/L (ref 20–33)
CO2 SERPL-SCNC: 13 MMOL/L (ref 20–33)
CO2 SERPL-SCNC: 13 MMOL/L (ref 20–33)
CO2 SERPL-SCNC: 14 MMOL/L (ref 20–33)
CO2 SERPL-SCNC: 6 MMOL/L (ref 20–33)
CREAT SERPL-MCNC: 0.62 MG/DL (ref 0.5–1.4)
CREAT SERPL-MCNC: 0.74 MG/DL (ref 0.5–1.4)
CREAT SERPL-MCNC: 0.83 MG/DL (ref 0.5–1.4)
CREAT SERPL-MCNC: 0.91 MG/DL (ref 0.5–1.4)
CREAT SERPL-MCNC: 1.03 MG/DL (ref 0.5–1.4)
DELSYS IDSYS: ABNORMAL
GFR SERPLBLD CREATININE-BSD FMLA CKD-EPI: 108 ML/MIN/1.73 M 2
GFR SERPLBLD CREATININE-BSD FMLA CKD-EPI: 113 ML/MIN/1.73 M 2
GFR SERPLBLD CREATININE-BSD FMLA CKD-EPI: 117 ML/MIN/1.73 M 2
GFR SERPLBLD CREATININE-BSD FMLA CKD-EPI: 123 ML/MIN/1.73 M 2
GFR SERPLBLD CREATININE-BSD FMLA CKD-EPI: 93 ML/MIN/1.73 M 2
GLUCOSE BLD STRIP.AUTO-MCNC: 100 MG/DL (ref 65–99)
GLUCOSE BLD STRIP.AUTO-MCNC: 114 MG/DL (ref 65–99)
GLUCOSE BLD STRIP.AUTO-MCNC: 147 MG/DL (ref 65–99)
GLUCOSE BLD STRIP.AUTO-MCNC: 150 MG/DL (ref 65–99)
GLUCOSE BLD STRIP.AUTO-MCNC: 158 MG/DL (ref 65–99)
GLUCOSE BLD STRIP.AUTO-MCNC: 158 MG/DL (ref 65–99)
GLUCOSE BLD STRIP.AUTO-MCNC: 179 MG/DL (ref 65–99)
GLUCOSE BLD STRIP.AUTO-MCNC: 186 MG/DL (ref 65–99)
GLUCOSE BLD STRIP.AUTO-MCNC: 194 MG/DL (ref 65–99)
GLUCOSE BLD STRIP.AUTO-MCNC: 199 MG/DL (ref 65–99)
GLUCOSE BLD STRIP.AUTO-MCNC: 201 MG/DL (ref 65–99)
GLUCOSE BLD STRIP.AUTO-MCNC: 215 MG/DL (ref 65–99)
GLUCOSE BLD STRIP.AUTO-MCNC: 221 MG/DL (ref 65–99)
GLUCOSE BLD STRIP.AUTO-MCNC: 227 MG/DL (ref 65–99)
GLUCOSE BLD STRIP.AUTO-MCNC: 235 MG/DL (ref 65–99)
GLUCOSE BLD STRIP.AUTO-MCNC: 240 MG/DL (ref 65–99)
GLUCOSE BLD STRIP.AUTO-MCNC: 243 MG/DL (ref 65–99)
GLUCOSE BLD STRIP.AUTO-MCNC: 245 MG/DL (ref 65–99)
GLUCOSE BLD STRIP.AUTO-MCNC: 303 MG/DL (ref 65–99)
GLUCOSE BLD STRIP.AUTO-MCNC: 358 MG/DL (ref 65–99)
GLUCOSE BLD STRIP.AUTO-MCNC: 87 MG/DL (ref 65–99)
GLUCOSE SERPL-MCNC: 175 MG/DL (ref 65–99)
GLUCOSE SERPL-MCNC: 250 MG/DL (ref 65–99)
GLUCOSE SERPL-MCNC: 259 MG/DL (ref 65–99)
GLUCOSE SERPL-MCNC: 92 MG/DL (ref 65–99)
GLUCOSE SERPL-MCNC: 96 MG/DL (ref 65–99)
HCO3 BLDV-SCNC: 8 MMOL/L (ref 22–29)
LACTATE BLD-SCNC: 1 MMOL/L (ref 0.5–2)
LPM ILPM: 0 LPM
MAGNESIUM SERPL-MCNC: 1.9 MG/DL (ref 1.5–2.5)
MAGNESIUM SERPL-MCNC: 1.9 MG/DL (ref 1.5–2.5)
MAGNESIUM SERPL-MCNC: 2 MG/DL (ref 1.5–2.5)
MAGNESIUM SERPL-MCNC: 2.2 MG/DL (ref 1.5–2.5)
MAGNESIUM SERPL-MCNC: 2.4 MG/DL (ref 1.5–2.5)
O2/TOTAL GAS SETTING VFR VENT: 0 %
PCO2 BLDV: 20.7 MMHG (ref 38–54)
PCO2 TEMP ADJ BLDV: 20.6 MMHG (ref 38–54)
PH BLDV: 7.2 [PH] (ref 7.31–7.45)
PH TEMP ADJ BLDV: 7.2 [PH] (ref 7.31–7.45)
PHOSPHATE SERPL-MCNC: 1.2 MG/DL (ref 2.5–4.5)
PHOSPHATE SERPL-MCNC: 1.6 MG/DL (ref 2.5–4.5)
PHOSPHATE SERPL-MCNC: 1.9 MG/DL (ref 2.5–4.5)
PHOSPHATE SERPL-MCNC: 2.6 MG/DL (ref 2.5–4.5)
PHOSPHATE SERPL-MCNC: 3.6 MG/DL (ref 2.5–4.5)
PO2 BLDV: 57 MMHG (ref 23–48)
PO2 TEMP ADJ BLDV: 56 MMHG (ref 23–48)
POTASSIUM SERPL-SCNC: 3.4 MMOL/L (ref 3.6–5.5)
POTASSIUM SERPL-SCNC: 3.5 MMOL/L (ref 3.6–5.5)
POTASSIUM SERPL-SCNC: 3.8 MMOL/L (ref 3.6–5.5)
POTASSIUM SERPL-SCNC: 4.6 MMOL/L (ref 3.6–5.5)
POTASSIUM SERPL-SCNC: 4.6 MMOL/L (ref 3.6–5.5)
SAO2 % BLDV: 83 % (ref 60–85)
SODIUM SERPL-SCNC: 134 MMOL/L (ref 135–145)
SODIUM SERPL-SCNC: 135 MMOL/L (ref 135–145)
SODIUM SERPL-SCNC: 136 MMOL/L (ref 135–145)
SODIUM SERPL-SCNC: 137 MMOL/L (ref 135–145)
SODIUM SERPL-SCNC: 137 MMOL/L (ref 135–145)
SPECIMEN DRAWN FROM PATIENT: ABNORMAL

## 2025-03-21 PROCEDURE — 700111 HCHG RX REV CODE 636 W/ 250 OVERRIDE (IP)

## 2025-03-21 PROCEDURE — 84100 ASSAY OF PHOSPHORUS: CPT | Mod: 91

## 2025-03-21 PROCEDURE — 99291 CRITICAL CARE FIRST HOUR: CPT | Performed by: STUDENT IN AN ORGANIZED HEALTH CARE EDUCATION/TRAINING PROGRAM

## 2025-03-21 PROCEDURE — 700111 HCHG RX REV CODE 636 W/ 250 OVERRIDE (IP): Performed by: INTERNAL MEDICINE

## 2025-03-21 PROCEDURE — 700102 HCHG RX REV CODE 250 W/ 637 OVERRIDE(OP): Performed by: STUDENT IN AN ORGANIZED HEALTH CARE EDUCATION/TRAINING PROGRAM

## 2025-03-21 PROCEDURE — 700111 HCHG RX REV CODE 636 W/ 250 OVERRIDE (IP): Performed by: STUDENT IN AN ORGANIZED HEALTH CARE EDUCATION/TRAINING PROGRAM

## 2025-03-21 PROCEDURE — 700102 HCHG RX REV CODE 250 W/ 637 OVERRIDE(OP)

## 2025-03-21 PROCEDURE — 700105 HCHG RX REV CODE 258: Performed by: STUDENT IN AN ORGANIZED HEALTH CARE EDUCATION/TRAINING PROGRAM

## 2025-03-21 PROCEDURE — 83735 ASSAY OF MAGNESIUM: CPT | Mod: 91

## 2025-03-21 PROCEDURE — 700105 HCHG RX REV CODE 258

## 2025-03-21 PROCEDURE — 8E0ZXY6 ISOLATION: ICD-10-PCS | Performed by: STUDENT IN AN ORGANIZED HEALTH CARE EDUCATION/TRAINING PROGRAM

## 2025-03-21 PROCEDURE — 700101 HCHG RX REV CODE 250

## 2025-03-21 PROCEDURE — 770022 HCHG ROOM/CARE - ICU (200)

## 2025-03-21 PROCEDURE — A9270 NON-COVERED ITEM OR SERVICE: HCPCS | Performed by: STUDENT IN AN ORGANIZED HEALTH CARE EDUCATION/TRAINING PROGRAM

## 2025-03-21 PROCEDURE — 80048 BASIC METABOLIC PNL TOTAL CA: CPT | Mod: 91

## 2025-03-21 PROCEDURE — A9270 NON-COVERED ITEM OR SERVICE: HCPCS

## 2025-03-21 PROCEDURE — 82962 GLUCOSE BLOOD TEST: CPT | Mod: 91

## 2025-03-21 RX ORDER — THIAMINE HYDROCHLORIDE 100 MG/ML
200 INJECTION, SOLUTION INTRAMUSCULAR; INTRAVENOUS EVERY 8 HOURS
Status: DISCONTINUED | OUTPATIENT
Start: 2025-03-21 | End: 2025-03-23 | Stop reason: HOSPADM

## 2025-03-21 RX ORDER — POTASSIUM CHLORIDE 7.45 MG/ML
10 INJECTION INTRAVENOUS
Status: COMPLETED | OUTPATIENT
Start: 2025-03-21 | End: 2025-03-21

## 2025-03-21 RX ORDER — POTASSIUM CHLORIDE 7.45 MG/ML
10 INJECTION INTRAVENOUS ONCE
Status: COMPLETED | OUTPATIENT
Start: 2025-03-21 | End: 2025-03-21

## 2025-03-21 RX ORDER — POTASSIUM CHLORIDE 7.45 MG/ML
10 INJECTION INTRAVENOUS ONCE
Status: COMPLETED | OUTPATIENT
Start: 2025-03-22 | End: 2025-03-22

## 2025-03-21 RX ORDER — ENOXAPARIN SODIUM 100 MG/ML
40 INJECTION SUBCUTANEOUS DAILY
Status: DISCONTINUED | OUTPATIENT
Start: 2025-03-21 | End: 2025-03-23 | Stop reason: HOSPADM

## 2025-03-21 RX ORDER — BUTALBITAL, ACETAMINOPHEN AND CAFFEINE 50; 325; 40 MG/1; MG/1; MG/1
1 TABLET ORAL EVERY 6 HOURS PRN
Status: DISCONTINUED | OUTPATIENT
Start: 2025-03-21 | End: 2025-03-23 | Stop reason: HOSPADM

## 2025-03-21 RX ORDER — MAGNESIUM SULFATE HEPTAHYDRATE 40 MG/ML
2 INJECTION, SOLUTION INTRAVENOUS ONCE
Status: COMPLETED | OUTPATIENT
Start: 2025-03-21 | End: 2025-03-21

## 2025-03-21 RX ADMIN — HEPARIN SODIUM 5000 UNITS: 5000 INJECTION, SOLUTION INTRAVENOUS; SUBCUTANEOUS at 06:24

## 2025-03-21 RX ADMIN — DEXTROSE AND SODIUM CHLORIDE: 10; .45 INJECTION, SOLUTION INTRAVENOUS at 00:32

## 2025-03-21 RX ADMIN — POTASSIUM CHLORIDE 10 MEQ: 7.46 INJECTION, SOLUTION INTRAVENOUS at 12:54

## 2025-03-21 RX ADMIN — POTASSIUM CHLORIDE 10 MEQ: 7.46 INJECTION, SOLUTION INTRAVENOUS at 18:23

## 2025-03-21 RX ADMIN — BUTALBITAL, ACETAMINOPHEN AND CAFFEINE 1 TABLET: 325; 50; 40 TABLET ORAL at 09:38

## 2025-03-21 RX ADMIN — BUTALBITAL, ACETAMINOPHEN AND CAFFEINE 1 TABLET: 325; 50; 40 TABLET ORAL at 16:37

## 2025-03-21 RX ADMIN — ENOXAPARIN SODIUM 40 MG: 100 INJECTION SUBCUTANEOUS at 17:14

## 2025-03-21 RX ADMIN — DIBASIC SODIUM PHOSPHATE, MONOBASIC POTASSIUM PHOSPHATE AND MONOBASIC SODIUM PHOSPHATE 500 MG: 852; 155; 130 TABLET ORAL at 20:51

## 2025-03-21 RX ADMIN — SODIUM PHOSPHATE, MONOBASIC, MONOHYDRATE AND SODIUM PHOSPHATE, DIBASIC, ANHYDROUS 30 MMOL: 276; 142 INJECTION, SOLUTION INTRAVENOUS at 01:50

## 2025-03-21 RX ADMIN — DEXTROSE AND SODIUM CHLORIDE: 10; .45 INJECTION, SOLUTION INTRAVENOUS at 09:40

## 2025-03-21 RX ADMIN — SODIUM CHLORIDE 7 UNITS/HR: 9 INJECTION, SOLUTION INTRAVENOUS at 12:52

## 2025-03-21 RX ADMIN — SODIUM PHOSPHATE, MONOBASIC, MONOHYDRATE AND SODIUM PHOSPHATE, DIBASIC, ANHYDROUS 15 MMOL: 276; 142 INJECTION, SOLUTION INTRAVENOUS at 08:14

## 2025-03-21 RX ADMIN — THIAMINE HYDROCHLORIDE 200 MG: 100 INJECTION, SOLUTION INTRAMUSCULAR; INTRAVENOUS at 21:44

## 2025-03-21 RX ADMIN — POTASSIUM CHLORIDE 10 MEQ: 7.46 INJECTION, SOLUTION INTRAVENOUS at 19:33

## 2025-03-21 RX ADMIN — DIBASIC SODIUM PHOSPHATE, MONOBASIC POTASSIUM PHOSPHATE AND MONOBASIC SODIUM PHOSPHATE 500 MG: 852; 155; 130 TABLET ORAL at 16:37

## 2025-03-21 RX ADMIN — POTASSIUM CHLORIDE 10 MEQ: 7.46 INJECTION, SOLUTION INTRAVENOUS at 13:59

## 2025-03-21 RX ADMIN — DIBASIC SODIUM PHOSPHATE, MONOBASIC POTASSIUM PHOSPHATE AND MONOBASIC SODIUM PHOSPHATE 500 MG: 852; 155; 130 TABLET ORAL at 11:49

## 2025-03-21 RX ADMIN — MAGNESIUM SULFATE HEPTAHYDRATE 2 G: 2 INJECTION, SOLUTION INTRAVENOUS at 11:55

## 2025-03-21 RX ADMIN — POTASSIUM PHOSPHATE, MONOBASIC AND POTASSIUM PHOSPHATE, DIBASIC 30 MMOL: 224; 236 INJECTION, SOLUTION, CONCENTRATE INTRAVENOUS at 21:00

## 2025-03-21 RX ADMIN — POTASSIUM CHLORIDE 10 MEQ: 7.46 INJECTION, SOLUTION INTRAVENOUS at 08:35

## 2025-03-21 RX ADMIN — POTASSIUM CHLORIDE 10 MEQ: 7.46 INJECTION, SOLUTION INTRAVENOUS at 15:15

## 2025-03-21 RX ADMIN — POTASSIUM CHLORIDE 10 MEQ: 7.46 INJECTION, SOLUTION INTRAVENOUS at 11:53

## 2025-03-21 RX ADMIN — SENNOSIDES AND DOCUSATE SODIUM 2 TABLET: 50; 8.6 TABLET ORAL at 17:15

## 2025-03-21 RX ADMIN — POTASSIUM CHLORIDE 10 MEQ: 7.46 INJECTION, SOLUTION INTRAVENOUS at 21:56

## 2025-03-21 RX ADMIN — POTASSIUM CHLORIDE 10 MEQ: 7.46 INJECTION, SOLUTION INTRAVENOUS at 03:31

## 2025-03-21 RX ADMIN — BUTALBITAL, ACETAMINOPHEN AND CAFFEINE 1 TABLET: 325; 50; 40 TABLET ORAL at 23:18

## 2025-03-21 RX ADMIN — DEXTROSE AND SODIUM CHLORIDE: 10; .45 INJECTION, SOLUTION INTRAVENOUS at 19:09

## 2025-03-21 RX ADMIN — POTASSIUM CHLORIDE 10 MEQ: 7.46 INJECTION, SOLUTION INTRAVENOUS at 06:44

## 2025-03-21 RX ADMIN — POLYETHYLENE GLYCOL 3350 1 PACKET: 17 POWDER, FOR SOLUTION ORAL at 06:47

## 2025-03-21 RX ADMIN — OSELTAMIVIR PHOSPHATE 75 MG: 75 CAPSULE ORAL at 17:37

## 2025-03-21 RX ADMIN — OSELTAMIVIR PHOSPHATE 75 MG: 75 CAPSULE ORAL at 06:24

## 2025-03-21 RX ADMIN — POTASSIUM CHLORIDE 10 MEQ: 7.46 INJECTION, SOLUTION INTRAVENOUS at 20:50

## 2025-03-21 SDOH — ECONOMIC STABILITY: TRANSPORTATION INSECURITY
IN THE PAST 12 MONTHS, HAS THE LACK OF TRANSPORTATION KEPT YOU FROM MEDICAL APPOINTMENTS OR FROM GETTING MEDICATIONS?: NO

## 2025-03-21 SDOH — ECONOMIC STABILITY: TRANSPORTATION INSECURITY
IN THE PAST 12 MONTHS, HAS LACK OF RELIABLE TRANSPORTATION KEPT YOU FROM MEDICAL APPOINTMENTS, MEETINGS, WORK OR FROM GETTING THINGS NEEDED FOR DAILY LIVING?: NO

## 2025-03-21 ASSESSMENT — LIFESTYLE VARIABLES
TOTAL SCORE: 0
TOTAL SCORE: 0
EVER FELT BAD OR GUILTY ABOUT YOUR DRINKING: NO
ALCOHOL_USE: NO
EVER HAD A DRINK FIRST THING IN THE MORNING TO STEADY YOUR NERVES TO GET RID OF A HANGOVER: NO
HAVE YOU EVER FELT YOU SHOULD CUT DOWN ON YOUR DRINKING: NO
HAVE YOU EVER FELT YOU SHOULD CUT DOWN ON YOUR DRINKING: NO
HAVE PEOPLE ANNOYED YOU BY CRITICIZING YOUR DRINKING: NO
TOTAL SCORE: 0
EVER FELT BAD OR GUILTY ABOUT YOUR DRINKING: NO
HAVE YOU EVER FELT YOU SHOULD CUT DOWN ON YOUR DRINKING: NO
EVER FELT BAD OR GUILTY ABOUT YOUR DRINKING: NO
EVER FELT BAD OR GUILTY ABOUT YOUR DRINKING: NO
ALCOHOL_USE: NO
CONSUMPTION TOTAL: INCOMPLETE
HAVE PEOPLE ANNOYED YOU BY CRITICIZING YOUR DRINKING: NO
TOTAL SCORE: 0
DOES PATIENT WANT TO STOP DRINKING: NO
TOTAL SCORE: 0
ALCOHOL_USE: NO
DOES PATIENT WANT TO STOP DRINKING: NO
AVERAGE NUMBER OF DAYS PER WEEK YOU HAVE A DRINK CONTAINING ALCOHOL: 0
EVER HAD A DRINK FIRST THING IN THE MORNING TO STEADY YOUR NERVES TO GET RID OF A HANGOVER: NO
TOTAL SCORE: 0
TOTAL SCORE: 0
CONSUMPTION TOTAL: INCOMPLETE
CONSUMPTION TOTAL: NEGATIVE
TOTAL SCORE: 0
DOES PATIENT WANT TO STOP DRINKING: NO
ALCOHOL_USE: NO
HAVE YOU EVER FELT YOU SHOULD CUT DOWN ON YOUR DRINKING: NO
HAVE PEOPLE ANNOYED YOU BY CRITICIZING YOUR DRINKING: NO
TOTAL SCORE: 0
TOTAL SCORE: 0
HAVE PEOPLE ANNOYED YOU BY CRITICIZING YOUR DRINKING: NO
HOW MANY TIMES IN THE PAST YEAR HAVE YOU HAD 5 OR MORE DRINKS IN A DAY: 0
TOTAL SCORE: 0
ON A TYPICAL DAY WHEN YOU DRINK ALCOHOL HOW MANY DRINKS DO YOU HAVE: 0
TOTAL SCORE: 0
EVER HAD A DRINK FIRST THING IN THE MORNING TO STEADY YOUR NERVES TO GET RID OF A HANGOVER: NO
EVER HAD A DRINK FIRST THING IN THE MORNING TO STEADY YOUR NERVES TO GET RID OF A HANGOVER: NO
CONSUMPTION TOTAL: INCOMPLETE

## 2025-03-21 ASSESSMENT — PAIN DESCRIPTION - PAIN TYPE
TYPE: ACUTE PAIN

## 2025-03-21 ASSESSMENT — SOCIAL DETERMINANTS OF HEALTH (SDOH)
WITHIN THE LAST YEAR, HAVE TO BEEN RAPED OR FORCED TO HAVE ANY KIND OF SEXUAL ACTIVITY BY YOUR PARTNER OR EX-PARTNER?: NO
WITHIN THE PAST 12 MONTHS, THE FOOD YOU BOUGHT JUST DIDN'T LAST AND YOU DIDN'T HAVE MONEY TO GET MORE: NEVER TRUE
WITHIN THE LAST YEAR, HAVE YOU BEEN AFRAID OF YOUR PARTNER OR EX-PARTNER?: NO
WITHIN THE LAST YEAR, HAVE YOU BEEN AFRAID OF YOUR PARTNER OR EX-PARTNER?: NO
WITHIN THE LAST YEAR, HAVE YOU BEEN HUMILIATED OR EMOTIONALLY ABUSED IN OTHER WAYS BY YOUR PARTNER OR EX-PARTNER?: NO
WITHIN THE LAST YEAR, HAVE YOU BEEN KICKED, HIT, SLAPPED, OR OTHERWISE PHYSICALLY HURT BY YOUR PARTNER OR EX-PARTNER?: NO
WITHIN THE LAST YEAR, HAVE YOU BEEN HUMILIATED OR EMOTIONALLY ABUSED IN OTHER WAYS BY YOUR PARTNER OR EX-PARTNER?: NO
IN THE PAST 12 MONTHS, HAS THE ELECTRIC, GAS, OIL, OR WATER COMPANY THREATENED TO SHUT OFF SERVICE IN YOUR HOME?: NO
WITHIN THE LAST YEAR, HAVE YOU BEEN KICKED, HIT, SLAPPED, OR OTHERWISE PHYSICALLY HURT BY YOUR PARTNER OR EX-PARTNER?: NO
WITHIN THE LAST YEAR, HAVE TO BEEN RAPED OR FORCED TO HAVE ANY KIND OF SEXUAL ACTIVITY BY YOUR PARTNER OR EX-PARTNER?: NO
WITHIN THE PAST 12 MONTHS, YOU WORRIED THAT YOUR FOOD WOULD RUN OUT BEFORE YOU GOT THE MONEY TO BUY MORE: NEVER TRUE

## 2025-03-21 ASSESSMENT — COGNITIVE AND FUNCTIONAL STATUS - GENERAL
SUGGESTED CMS G CODE MODIFIER DAILY ACTIVITY: CH
MOBILITY SCORE: 24
SUGGESTED CMS G CODE MODIFIER MOBILITY: CH
DAILY ACTIVITIY SCORE: 24
SUGGESTED CMS G CODE MODIFIER MOBILITY: CH
SUGGESTED CMS G CODE MODIFIER DAILY ACTIVITY: CH
MOBILITY SCORE: 24
DAILY ACTIVITIY SCORE: 24

## 2025-03-21 ASSESSMENT — ENCOUNTER SYMPTOMS
RESPIRATORY NEGATIVE: 1
MUSCULOSKELETAL NEGATIVE: 1
EYES NEGATIVE: 1
NAUSEA: 1
PSYCHIATRIC NEGATIVE: 1
CARDIOVASCULAR NEGATIVE: 1
HEADACHES: 1
NEUROLOGICAL NEGATIVE: 1

## 2025-03-21 ASSESSMENT — PATIENT HEALTH QUESTIONNAIRE - PHQ9
SUM OF ALL RESPONSES TO PHQ9 QUESTIONS 1 AND 2: 0
1. LITTLE INTEREST OR PLEASURE IN DOING THINGS: NOT AT ALL
2. FEELING DOWN, DEPRESSED, IRRITABLE, OR HOPELESS: NOT AT ALL

## 2025-03-21 ASSESSMENT — FIBROSIS 4 INDEX: FIB4 SCORE: 0.85

## 2025-03-21 NOTE — ASSESSMENT & PLAN NOTE
Suspect new type 1 DM w/ A1C 13.3  No personal or family hx of diabetes or autoimmune disease   Continue insulin gtt per DKA protocol   Diabetes educator

## 2025-03-21 NOTE — PROGRESS NOTES
4 Eyes Skin Assessment Completed by Shala RN and Lauri RN.    Head WDL  Ears Redness and Blanching on left ear  Nose WDL  Mouth WDL  Neck WDL  Breast/Chest Scar old scar from ingrown chest hair  Shoulder Blades WDL  Spine WDL  (R) Arm/Elbow/Hand WDL  (L) Arm/Elbow/Hand WDL  Abdomen WDL  Groin WDL  Scrotum/Coccyx/Buttocks WDL  (R) Leg WDL  (L) Leg WDL  (R) Heel/Foot/Toe Bruising on top of foot  (L) Heel/Foot/Toe WDL          Devices In Places ECG, Blood Pressure Cuff, and Pulse Ox      Interventions In Place Pillows and Low Air Loss Mattress    Possible Skin Injury No    Pictures Uploaded Into Epic N/A  Wound Consult Placed N/A  RN Wound Prevention Protocol Ordered No

## 2025-03-21 NOTE — ED NOTES
5/17/2018      Lachelle Langston  [address] [facility] [address]    Dear Ms. Langston,    Your procedure is scheduled with Dr. Marco A Sanches on July 19, 2018 at:     [facility]  [address] [place]   [address]  [phone]  Please enter the main entrance and take the elevators to the 3rd floor.  Check in at Same Day Surgery desk.    You can expect to be contacted by Georgiana on 7/17/18 to confirm arrival and surgery time. PLEASE DISREGARD TIMES GIVEN BY ALL OTHERS.    The following appointment(s) have been scheduled for you:     • Post-op with Dr Sanches at the [id number] on August 8, 2018 at 10:40 am.    To better prepare for your surgery, please follow these instructions:    • Please schedule an appointment with your Primary Care Physician for a Preoperative History and Physical for 2-3 weeks before your surgery date.    This will be a physical examination and lab work to clear you for your surgery.  We will send them the information about your planned procedure and what testing we are looking for (and where to send it to).  If you haven't already done so, please call them ASAP to schedule an appointment. Please call Georgiana, surgery scheduler when your appointment has been scheduled with Primary Care Physician to confirm at [phone]. Voicemail is OK.     • Starting 10 days prior to your surgery, please do not take any Phentermine or other diet/weight loss products.  Continuing these medications in the 10 days before surgery will likely result in postponement due to anesthesia requirements.  Please consult with your prescribing physicians if you have any questions.     • Starting 5 days prior to your surgery, please do not take any aspirin products, anti-inflammatory medication or blood thinners.  This includes products such as [place]qu[place], Pepto Bismol, Motrin, Ibuprofen and Advil should also be avoided.    (Only  Report given to FLORIN Wilson   Tylenol is aspirin free, so Tylenol products are OK to take for pain.).    If you take Coumadin or other blood thinners contact your primary care physician.  You may take blood pressure and heart medications the morning of the procedure with a small amount of water.          • Do not have anything to eat or drink starting at midnight the night before your surgery.      • You will need someone to drive you home and remain with you up to 24 hours after you have been discharged.    • Be sure to use your HibiClens! It is a topical antiseptic, antimicrobial skin cleanser; Hibiclens gently and effectively cleanses skin and superficial wounds and can protect the skin for up to 24 hours. It’s gentle on patient skin and as simple and easy to use as any liquid soap.      Our Preauthorization Team will be contacting your insurance company to ensure that they have all of the information they need from us.  We will let you know if we encounter any issues or have any concerns in advance of your scheduled surgery.  If you have any questions regarding your benefits or eligibility, please check with your insurance company.  Please see attached for more information.    If you have any work related and/or disability forms that need to be completed, please contact the Forms Completion Department at [phone]. Forms can be dropped off at any of our [facility] locations. Please be advised that it can take 10 to 14 business days to complete these requests.    If you have questions regarding the procedure, medications, rehab, etc., please contact Dr. Sanches's nursing team at [phone].    If you have any scheduling questions or need to reschedule, please contact me at the telephone number and extension listed below.       Thank you,          Georgiana at [phone]  Surgery Scheduler for Dr. Marco A Sanches  [facility]        \"Help us grow our quality of service. We want to improve - and you can help us. You may receive  a survey in the mail. This is your opportunity to tell us what we did well, and where we could use some improvement. We value your input.\"                  Insurance Authorization Need to Knows    Prior to your surgical procedure, our team will contact your Insurance Company to initiate a Preauthorization request.      This is not a guarantee of payment from your insurance company, but rather a step taken to ensure that we have all of the information and documentation for them to confirm the procedure is one that is eligible for coverage under your plan.    We will contact you if we either need more information from you to fulfill the requirements of your insurance company, or if we need to discuss any concerns that may lead to postponement or cancellation of your procedure.     What to do if… My Insurance Changes:  If, at any time, your insurance company, plan or even card changes, please call our office so that our team can be sure to update your records.  We will need to make sure to submit any PreAuth or john to the correct, up-to-date insurance plan.      What to do if… My Insurance Requires A Referral:  If your insurance company requires a Referral for Specialty Care or to see a Specialist, you will need to confirm with them if you have one on file.    - If your insurance carrier does not have a referral, then you will need to contact your Primary Care Physician to have one directly submitted to your insurance company ASAP.    - Without a referral on file, your insurance company will not Pre-Authorize your surgery and may not cover any of your care with our specialty.    What to do if… I have a Work Comp (W/C) Claim:  If you have a W/C claim, please be sure to provide our reception team with the information you have regarding your claim ASAP.  We will contact your W/C carrier/adjustor to inform them of your upcoming surgery and check the status of your claim (open vs closed).  We will let you know if they  advise of any concerns or issues with your claim.  - Even if you have an open W/C claim, please also provide us with your personal/family insurance.  We will want to be sure this plan is loaded into your account.  We always PreAuth with personal insurance as a back-up to W/C.  Otherwise, if W/C doesn’t cover something along the way, you will receive a bill for the services.    What to do if… I have Month-to-Month Coverage/Premiums:  If you have an insurance plan that is paid for month to month, or is subject to plan change on a monthly basis, please be aware we cannot initiate PreAuth until just before the month of your surgery, as your insurance company will need to verify your premium payments/eligibility first.    What to do if… I Do Not Have Insurance Coverage:  If you do not have insurance coverage, please call our Patient Contact Center:  [phone], option # 6 or a  at the hospital to discuss possible coverage options and/or billing options.     What to do if… I have other Insurance/Billing questions: If you have questions regarding our billing process, setting up payment plans, our fee schedule, etc. please call our Patient Contact Center:  [phone].  If you need information regarding your level of benefits or out-of-pocket expenses, please contact your insurance company directly.  They can also confirm for you whether or not we (the surgeon and the hospital/surgery center) are in your plan’s preferred network (aka ‘in-network’).

## 2025-03-21 NOTE — ED NOTES
Chief Complaint   Patient presents with    Cough    N/V    Abdominal Pain    Constipation    Headache    Tired     Pt ambulated to triage with above complaints x4days. Pt said that he is constantly drinking water , checked fsbs 500. No history of DM but somebody mentioned to him that he is prediabetic.   Pt said that he was given abx and steroid at urgent care for flu like symptoms.

## 2025-03-21 NOTE — PROGRESS NOTES
[facility] Internal Medicine Daily Progress Note    Date of Service  3/21/2025    [facility] Team: [facility] ICU Gold Team   Attending: Guanaco Emery M.d.  Senior Resident: Dr. Schwab  Contact Number: [phone]    Chief Complaint  Michael Vázquez is a 39 y.o. male admitted 3/20/2025 with DKA, influenza A.    Hospital Course  Michael Vázquez is a 39 y.o. male with no significant past medical history who presents who has been feeling unwell for the last 4 days with generalized fatigue, nausea and vomiting, and headache. He has also been peeing a lot and feeling a little short of breath and constipated. He has not been able to keep much down. He has recently been feeling ill and was given antibiotics and steroids 2 days ago by urgent care. He denies cough, chest pain, abdominal pain, diarrhea, dysuria. States his 5yo daughter is sick at home. He has no family history of diabetes.     Interval Problem Update  NAEO  -P 80s  -/63  -RA 93%  -DVT Lovenox  -GI- N/a  -Abx: Tamiflu  -IO: 1900 UOP    I have discussed this patient's plan of care and discharge plan at IDT rounds today with Case Management, Nursing, Nursing leadership, and other members of the IDT team.    Consultants/Specialty  critical care    Code Status  Full Code    Disposition  The patient is not medically cleared for discharge to home or a post-acute facility.  Anticipate discharge to: To be determined.    I have placed the appropriate orders for post-discharge needs.    Review of Systems  Review of Systems   Constitutional:  Positive for malaise/fatigue.   HENT: Negative.     Eyes: Negative.    Respiratory: Negative.     Cardiovascular: Negative.    Gastrointestinal:  Positive for nausea.   Genitourinary: Negative.    Musculoskeletal: Negative.    Skin: Negative.    Neurological: Negative.    Endo/Heme/Allergies: Negative.    Psychiatric/Behavioral: Negative.            Physical Exam  Temp:  [36.6 °C (97.9 °F)-37.2 °C (99 °F)] 36.7 °C (98 °F)  Pulse:   [] 77  Resp:  [14-27] 17  BP: (101-176)/() 126/87  SpO2:  [91 %-98 %] 95 %    Physical Exam  Constitutional:       General: He is not in acute distress.     Appearance: Normal appearance. He is normal weight. He is not ill-appearing, toxic-appearing or diaphoretic.   HENT:      Right Ear: External ear normal.      Left Ear: External ear normal.      Nose: Nose normal.      Mouth/Throat:      Mouth: Mucous membranes are dry.   Eyes:      Extraocular Movements: Extraocular movements intact.      Conjunctiva/sclera: Conjunctivae normal.   Cardiovascular:      Rate and Rhythm: Normal rate and regular rhythm.      Pulses: Normal pulses.      Heart sounds: Normal heart sounds.   Pulmonary:      Effort: Pulmonary effort is normal.      Breath sounds: Normal breath sounds.   Abdominal:      General: There is no distension.      Palpations: Abdomen is soft. There is no mass.      Tenderness: There is no abdominal tenderness. There is no right CVA tenderness, left CVA tenderness, guarding or rebound.      Hernia: No hernia is present.   Musculoskeletal:         General: Normal range of motion.      Cervical back: Normal range of motion and neck supple.   Skin:     General: Skin is warm.      Coloration: Skin is not jaundiced or pale.      Findings: No bruising, erythema, lesion or rash.   Neurological:      General: No focal deficit present.      Mental Status: He is alert and oriented to person, place, and time.   Psychiatric:         Mood and Affect: Mood normal.         Behavior: Behavior normal.         Thought Content: Thought content normal.         Judgment: Judgment normal.         Fluids    Intake/Output Summary (Last 24 hours) at 3/21/2025 1235  Last data filed at 3/21/2025 1000  Gross per 24 hour   Intake 2318.44 ml   Output 1900 ml   Net 418.44 ml       Laboratory  Recent Labs     03/20/25  1834 03/20/25  2200   WBC 12.3* 10.0   RBC 6.25* 5.50   HEMOGLOBIN 18.5* 16.2   HEMATOCRIT 53.5* 48.7   MCV 85.6  88.5   MCH 29.6 29.5   MCHC 34.6 33.3   RDW 38.6 39.6   PLATELETCT 459* 334   MPV 9.0 8.7*     Recent Labs     03/21/25  0143 03/21/25  0524 03/21/25  1035   SODIUM 134* 136 137   POTASSIUM 4.6 3.8 3.4*   CHLORIDE 113* 112 111   CO2 6* 11* 14*   GLUCOSE 259* 250* 175*   BUN 19 19 19   CREATININE 1.03 0.91 0.83   CALCIUM 8.4* 8.2* 8.3*                   Imaging  DX-CHEST-PORTABLE (1 VIEW)   Final Result         1. No acute cardiopulmonary abnormalities are identified.           Assessment/Plan  Problem Representation:    * DKA, type 1, not at goal (HCC)- (present on admission)  Assessment & Plan  Glucose 520, bicarb 6, AG 31, BHB 8.95. New onset diabetic, A1c 13, with recent URI treated with few days of steroids. At his age, unclear whether T2 or T1DM.    -DKA protocol  -Insulin Abs: BASSEM, IAA, IA-2, C-peptide  -Prn antiemetics, pain mgmt  -NPO, ice chips ok  -Diabetes education in the morning  -Will likely need insulin on discharge until insulin Abs return    Polycythemia- (present on admission)  Assessment & Plan  Likely due to hemoconcentration due to glucosuria, trend    Constipation- (present on admission)  Assessment & Plan  Bowel protocol as needed    Leukocytosis- (present on admission)  Assessment & Plan  Likely reactive, monitor    Acute kidney injury (HCC)- (present on admission)  Assessment & Plan  Baseline sCr ~1, up to 1.49 on admission. Likely pre-renal due to glucosuria.    -Fluid resuscitate per DKA protocol  -Avoid nephrotoxins and renally dose meds    Influenza A- (present on admission)  Assessment & Plan  Patient reports subjective shortness of breath, not requiring oxygen. CXR clear. PCT 0.09. Started on Tamiflu in the ED.    -Tamiflu         VTE prophylaxis: heparin ppx    I have performed a physical exam and reviewed and updated ROS and Plan today (3/21/2025). In review of yesterday's note (3/20/2025), there are no changes except as documented above.

## 2025-03-21 NOTE — ASSESSMENT & PLAN NOTE
Glucose 520, bicarb 6, AG 31, BHB 8.95. New onset diabetic, A1c 13, with recent URI treated with few days of steroids. At his age, unclear whether T2 or T1DM.    -DKA protocol  -Insulin Abs: BASSEM, IAA, IA-2, C-peptide  -Prn antiemetics, pain mgmt  -NPO, ice chips ok  -Diabetes education  -Will likely need insulin on discharge until insulin Abs return  -Transition to Lantus once HCO3 >=16, anion gap closed.

## 2025-03-21 NOTE — CARE PLAN
The patient is Watcher - Medium risk of patient condition declining or worsening    Shift Goals  Clinical Goals: electrolyte, DKA  Patient Goals: DKA, eat  Family Goals: EDMUNDO    Progress made toward(s) clinical / shift goals:    Problem: Pain - Standard  Goal: Alleviation of pain or a reduction in pain to the patient’s comfort goal  Outcome: Progressing     Problem: Knowledge Deficit - Standard  Goal: Patient and family/care givers will demonstrate understanding of plan of care, disease process/condition, diagnostic tests and medications  Outcome: Progressing     Problem: Diabetes Management - DKA  Goal: Patient will achieve and maintain glucose in satisfactory range  Outcome: Progressing     Problem: Fluid Balance or Risk for Fluid Volume Deficit  Goal: Patient will demonstrate adequate hydration and vital signs  Outcome: Progressing     Problem: Respiratory  Goal: Patient will achieve/maintain optimum respiratory ventilation and gas exchange  Outcome: Progressing     Problem: Neuro Status  Goal: Neuro status will remain stable or improve  Outcome: Progressing       Pa

## 2025-03-21 NOTE — HOSPITAL COURSE
Michael Vázquez is a 39 y.o. male with no significant past medical history who presents who has been feeling unwell for the last 4 days with generalized fatigue, nausea and vomiting, and headache. He has also been peeing a lot and feeling a little short of breath and constipated. He has not been able to keep much down. He has recently been feeling ill and was given antibiotics and steroids 2 days ago by urgent care. He denies cough, chest pain, abdominal pain, diarrhea, dysuria. States his 5yo daughter is sick at home. He has no family history of diabetes.     3/21: DKA protocol  3/22: Approaching resolution; pending goal HCO3 prior to transitioning to lantus.

## 2025-03-21 NOTE — ED PROVIDER NOTES
ED Provider Note    CHIEF COMPLAINT  Chief Complaint   Patient presents with    Cough    N/V    Abdominal Pain    Constipation    Headache    Tired       EXTERNAL RECORDS REVIEWED  Reviewed recent urgent care records reviewed most recent laboratory studies from a year and a half ago indicating hyperglycemia    HPI/ROS  LIMITATION TO HISTORY   None  OUTSIDE HISTORIAN(S):  None    Michael Vázquez is a 39 y.o. male who presents for a constellation of symptoms including severe fatigue cough body aches.  He has been sick for almost a week.  He went to urgent care and was prescribed antibiotics and steroids with no improvement.  He also reports weight loss as well as significant polyuria and polydipsia.  He was told about a year and a half ago that he might be a borderline diabetic based on a preoperative metabolic panel for an elective hernia repair.  He never followed up on this.  He reports feeling quite ill.  He denies chest pain but he has been short of breath and breathing heavy.  No report of any high fever or productive cough    PAST MEDICAL HISTORY   Possible borderline diabetes    SURGICAL HISTORY   has a past surgical history that includes no pertinent past surgical history and inguinal hernia repair robotic xi (Bilateral, 7/1/2024).    FAMILY HISTORY  No family history on file.    SOCIAL HISTORY  Social History     Tobacco Use    Smoking status: Never    Smokeless tobacco: Never   Vaping Use    Vaping status: Never Used   Substance and Sexual Activity    Alcohol use: Not Currently     Alcohol/week: 0.6 - 1.2 oz     Types: 1 - 2 Glasses of wine per week    Drug use: No    Sexual activity: Not on file     No IV drugs  CURRENT MEDICATIONS  Home Medications    **Home medications have not yet been reviewed for this encounter**       Audit from Redirected Encounters    **Home medications have not yet been reviewed for this encounter**       Patient finished a Z-Buddy and prednisone  ALLERGIES  No Known  "Allergies    PHYSICAL EXAM  VITAL SIGNS: BP (!) 151/94   Pulse 91   Temp 36.6 °C (97.9 °F) (Temporal)   Resp 16   Ht 1.791 m (5' 10.5\")   Wt 71.2 kg (156 lb 15.5 oz)   SpO2 97%   BMI 22.20 kg/m²    Pulse ox interpretation: I interpret this pulse ox as normal.  Constitutional: Alert and oriented x 3, ill-appearing moderate distress  HEENT: Atraumatic normocephalic, pupils are equal round reactive to light extraocular movements are intact. The nares is clear, external ears are normal, mouth shows dry mucous membranes normal dentition for age  Neck: Supple, no JVD no tracheal deviation  Cardiovascular: Mild tachycardia no murmur rub or gallop 2+ pulses peripherally x4  Thorax & Lungs: No respiratory distress, no wheezes rales or rhonchi, No chest tenderness.   GI: Soft nontender nondistended positive bowel sounds, no peritoneal signs no rebound or guarding  Skin: Warm dry no acute rash or lesion  Musculoskeletal: Moving all extremities with full range and 5 of 5 strength no acute  deformity no pedal edema  Neurologic: Cranial nerves III through XII are grossly intact no sensory deficit no cerebellar dysfunction   Psychiatric: Appropriate affect for situation at this time          EKG/LABS  Results for orders placed or performed during the hospital encounter of 03/20/25   POCT glucose device results    Collection Time: 03/20/25  6:19 PM   Result Value Ref Range    POC Glucose, Blood 500 (H) 65 - 99 mg/dL   CBC WITH DIFFERENTIAL    Collection Time: 03/20/25  6:34 PM   Result Value Ref Range    WBC 12.3 (H) 4.8 - 10.8 K/uL    RBC 6.25 (H) 4.70 - 6.10 M/uL    Hemoglobin 18.5 (H) 14.0 - 18.0 g/dL    Hematocrit 53.5 (H) 42.0 - 52.0 %    MCV 85.6 81.4 - 97.8 fL    MCH 29.6 27.0 - 33.0 pg    MCHC 34.6 32.3 - 36.5 g/dL    RDW 38.6 35.9 - 50.0 fL    Platelet Count 459 (H) 164 - 446 K/uL    MPV 9.0 9.0 - 12.9 fL    Neutrophils-Polys 91.00 (H) 44.00 - 72.00 %    Lymphocytes 7.20 (L) 22.00 - 41.00 %    Monocytes 0.90 0.00 - " 13.40 %    Eosinophils 0.00 0.00 - 6.90 %    Basophils 0.00 0.00 - 1.80 %    Nucleated RBC 0.00 0.00 - 0.20 /100 WBC    Neutrophils (Absolute) 11.19 (H) 1.82 - 7.42 K/uL    Lymphs (Absolute) 0.89 (L) 1.00 - 4.80 K/uL    Monos (Absolute) 0.11 0.00 - 0.85 K/uL    Eos (Absolute) 0.00 0.00 - 0.51 K/uL    Baso (Absolute) 0.00 0.00 - 0.12 K/uL    NRBC (Absolute) 0.00 K/uL   COMP METABOLIC PANEL    Collection Time: 03/20/25  6:34 PM   Result Value Ref Range    Sodium 135 135 - 145 mmol/L    Potassium 4.8 3.6 - 5.5 mmol/L    Chloride 98 96 - 112 mmol/L    Co2 6 (LL) 20 - 33 mmol/L    Anion Gap 31.0 (H) 7.0 - 16.0    Glucose 520 (HH) 65 - 99 mg/dL    Bun 21 8 - 22 mg/dL    Creatinine 1.46 (H) 0.50 - 1.40 mg/dL    Calcium 10.3 8.5 - 10.5 mg/dL    Correct Calcium 9.7 8.5 - 10.5 mg/dL    AST(SGOT) 13 12 - 45 U/L    ALT(SGPT) 16 2 - 50 U/L    Alkaline Phosphatase 146 (H) 30 - 99 U/L    Total Bilirubin 0.5 0.1 - 1.5 mg/dL    Albumin 4.7 3.2 - 4.9 g/dL    Total Protein 9.2 (H) 6.0 - 8.2 g/dL    Globulin 4.5 (H) 1.9 - 3.5 g/dL    A-G Ratio 1.0 g/dL   LIPASE    Collection Time: 03/20/25  6:34 PM   Result Value Ref Range    Lipase 39 11 - 82 U/L   VENOUS BLOOD GAS    Collection Time: 03/20/25  6:34 PM   Result Value Ref Range    Venous Bg Ph 7.06 (LL) 7.31 - 7.45    Venous Bg Ph Temp Corrected 7.06 (LL) 7.31 - 7.45    Venous Bg Pco2 25.2 (L) 38.0 - 54.0 mmHg    Venous Bg Pco2 Temp Corrected 24.8 (L) 38.0 - 54.0 mmHg    Venous Bg Po2 35.1 23.0 - 48.0 mmHg    Venous Bg Po2 Temp Corrected 34.1 23.0 - 48.0 mmHg    Venous Bg O2 Saturation 64.0 60.0 - 85.0 %    Venous Bg Hco3 7 (L) 22 - 29 mmol/L    Venous Bg Base Excess -22 (L) -2 - 3 mmol/L    Body Temp 36.6 Centigrade   MAGNESIUM    Collection Time: 03/20/25  6:34 PM   Result Value Ref Range    Magnesium 2.3 1.5 - 2.5 mg/dL   PHOSPHORUS    Collection Time: 03/20/25  6:34 PM   Result Value Ref Range    Phosphorus 3.6 2.5 - 4.5 mg/dL   HEMOGLOBIN A1C    Collection Time: 03/20/25  6:34 PM    Result Value Ref Range    Glycohemoglobin 13.8 (H) 4.0 - 5.6 %    Est Avg Glucose 349 mg/dL   DIFFERENTIAL MANUAL    Collection Time: 03/20/25  6:34 PM   Result Value Ref Range    Metamyelocytes 0.90 %    Manual Diff Status PERFORMED    PERIPHERAL SMEAR REVIEW    Collection Time: 03/20/25  6:34 PM   Result Value Ref Range    Peripheral Smear Review see below    PLATELET ESTIMATE    Collection Time: 03/20/25  6:34 PM   Result Value Ref Range    Plt Estimation Normal    MORPHOLOGY    Collection Time: 03/20/25  6:34 PM   Result Value Ref Range    RBC Morphology Normal    ESTIMATED GFR    Collection Time: 03/20/25  6:34 PM   Result Value Ref Range    GFR (CKD-EPI) 61 >60 mL/min/1.73 m 2   POC CoV-2, FLU A/B, RSV by PCR    Collection Time: 03/20/25  6:40 PM   Result Value Ref Range    POC Influenza A RNA, PCR POSITIVE (A) Negative    POC Influenza B RNA, PCR Negative Negative    POC RSV, by PCR Negative Negative    POC SARS-CoV-2, PCR NotDetected NotDetected       I have independently interpreted this EKG    RADIOLOGY/PROCEDURES   I have independently interpreted the diagnostic imaging associated with this visit and am waiting the final reading from the radiologist.   My preliminary interpretation is as follows: No acute cardiopulmonary process    Radiologist interpretation:  DX-CHEST-PORTABLE (1 VIEW)   Final Result         1. No acute cardiopulmonary abnormalities are identified.          COURSE & MEDICAL DECISION MAKING    ASSESSMENT, COURSE AND PLAN  Care Narrative:    This is a critically ill 39-year-old gentleman who was brought in for several days of bodyaches malaise weight loss polyuria and polydipsia.  His blood sugar in triage was greater than 500 therefore we immediately brought him back to resuscitation room recognizing that he is critically ill.  2 large-bore IVs were established.  He was given 2 L of crystalloid and his workup here demonstrates severe metabolic acidosis with a blood sugar greater than  500 anion gap of 31 and a CO2 of 6 with evidence of diabetic ketoacidosis.  He also has a severely depressed venous blood gas with a VBG of 7.06 and mild leukocytosis.  Chest x-ray is clear and he is positive for influenza A.  I will add on procalcitonin to rule out concomitant bacterial infection.  Patient is critically ill and require admission to the intensive care unit for DKA protocol.  Consultation with the intensivist was obtained.          ADDITIONAL PROBLEMS MANAGED      DISPOSITION AND DISCUSSIONS  I have discussed management of the patient with the following physicians and ALEXIA's: Discussed plan of care with ICU physician    Discussion of management with other QHP or appropriate source(s): Discussed insulin therapy with ER pharmacist    Escalation of care considered, and ultimately not performed: Considered pushes of bicarb    Barriers to care at this time, including but not limited to: None.     Decision tools and prescription drugs considered including, but not limited to: None.    FINAL DIAGNOSIS  1. Diabetic ketoacidosis without coma associated with type 1 diabetes mellitus (HCC)    2. Influenza A       CRITICAL CARE  The very real possibilty of a deterioration of this patient's condition required the highest level of my preparedness for sudden, emergent intervention.  I provided critical care services, which included medication orders, frequent reevaluations of the patient's condition and response to treatment, ordering and reviewing test results, and discussing the case with various consultants.  The critical care time associated with the care of the patient was 40 minutes. Review chart for interventions. This time is exclusive of any other billable procedures.     Electronically signed by: Norm García M.D., 3/20/2025 7:54 PM

## 2025-03-21 NOTE — HOSPITAL COURSE
"\"39-year-old male with no history of diabetes in himself or family who has multiple children at home 1 of which had a cold in the last few weeks and he secondarily picked it up last week and was seen at urgent care where he was started on steroids and a Z-Buddy and now presents with several day history of nausea vomiting fatigue and found to have classic labs consistent with DKA.\" - Dr Woody     3/21: Bicarb still low, remains on insulin gtt  "

## 2025-03-21 NOTE — ED NOTES
Bedside report received from off going RN Elaine, assumed care of patient.  POC discussed with patient. Call light within reach, all needs addressed at this time.       Fall risk interventions in place: Patient's personal possessions are with in their safe reach, Place fall risk sign on patient's door, Keep floor surfaces clean and dry, and Accompanied to restroom (all applicable per [place] Fall risk assessment)   Continuous monitoring: Cardiac Leads, Pulse Ox, or Blood Pressure  IVF/IV medications: Infusion per MAR (List Med(s)) 2L NS  Oxygen: Room Air  Bedside sitter: Not Applicable   Isolation: Not Applicable

## 2025-03-21 NOTE — ED NOTES
Marlena from Lab called with critical result of CO2 at 6, glucose 520. Critical lab result read back to Marlena.   Dr. García notified of critical lab result at 1943.

## 2025-03-21 NOTE — ASSESSMENT & PLAN NOTE
Baseline sCr ~1, up to 1.49 on admission. Likely pre-renal due to glucosuria.    -Fluid resuscitate per DKA protocol  -Avoid nephrotoxins and renally dose meds

## 2025-03-21 NOTE — ASSESSMENT & PLAN NOTE
Patient reports subjective shortness of breath, not requiring oxygen. CXR clear. PCT 0.09. Started on Tamiflu in the ED.    -Tamiflu

## 2025-03-21 NOTE — DIETARY
"Nutrition Services: Initial Assessment     Day 1 of admit. Michael Vázquez is 39 y.o., male with admitting DX of DKA, type 1, not at goal (HCC) [E10.10].    Consult Received for: MST - Malnutrition Screening Tool score 4: unplanned wt loss >/=34 lb x >1 year, no poor PO intake PTA    Current Hospital Problems List:      DKA, type 1, not at goal (HCC) (Chronic) (POA: Yes)    Influenza A (POA: Yes)    Acute kidney injury (HCC) (POA: Yes)    Leukocytosis (POA: Yes)    Constipation (POA: Yes)    Polycythemia (POA: Yes)    Generalized headaches (POA: Unknown)    Nutrition Assessment:      Height: 179.1 cm (5' 10.5\")  Weight: 71.3 kg (157 lb 3 oz)  Weight taken via: Stand Up Scale  Body mass index is 22.24 kg/m².  BMI Classification: WNL     Wt Readings from Last 4 Encounters:  03/21/25 71.3 kg (157 lb 3 oz)  03/18/25 77.1 kg (170 lb) - suspect is stated wt  01/23/25 78 kg (172 lb)  07/01/24 78.5 kg (173 lb 1 oz)     Objective:   Pertinent Medical Hx: none on file. No personal or family hx of DM  Pertinent Labs: K+ 3.4, glucose 175, Ca 8.3, phos 1.9, A1c 13.8%  Pertinent Meds: DKA K+ scale, D10 1/2 NS @ 100 mL/hr, mag sulfate, insulin gtt 7 units/hr, tamiflu, Kphos, KCl, miralax  Skin/Wounds:  none documented  Food Allergies: NKFA  Last BM:     03/18/25 (patient stated)     Current Diet Order/Intake:   NPO    Subjective:   Telehealth visit - attempted call to pt's cell phone for interview and diet education. No answer to attempted call.    Nutrition Focused Physical Exam (NFPE)  Weight Loss: -8.6% in 2 months, suspect r/t new DM dx.  NFPE: EDMUNDO - telehealth visit  Fluid Accumulation: none documented  Reduced  Strength: N/A in acute care setting.    Nutrition Diagnosis:      Unintended Weight Loss related to new T1DM dx as evidenced by pt w/ 8.6% wt loss in 2 months.      Unable to fully assess for malnutrition at this time    Nutrition Interventions:      PO diet as medically feasible to be per MD.  Diet education " consult for new DM dx left active - RD to follow up w/ pt for diet education teaching as able and pt available.  Patient aware of active plan of care as appropriate.     Nutrition Monitoring and Evaluation:      Monitor nutrition POC, goal for intake % of meals once diet order initiated.  Monitor weight trends.    RD following and will provide updated recommendations as indicated.      Catherine Smiley R.D.                                         [facility]ITERIA FOR MALNUTRITION

## 2025-03-21 NOTE — ED NOTES
Med Rec completed per patient     Allergies reviewed: yes     Antibiotics in the past 30 days: yes   Doxycycline 100 mg BID. 7 day course started 03/18/2025. Last dose: 03/20/2025 am   Patient stated he took his pm dose today but threw it up.     Anticoagulant in past 14 days: no    Pharmacy patient utilizes: [facility]  [phone]

## 2025-03-21 NOTE — PROGRESS NOTES
"Critical Care Progress Note    Date of admission  3/20/2025    Chief Complaint  39 y.o. male admitted 3/20/2025 with DKA and influenza    Hospital Course  \"39-year-old male with no history of diabetes in himself or family who has multiple children at home 1 of which had a cold in the last few weeks and he secondarily picked it up last week and was seen at urgent care where he was started on steroids and a Z-Buddy and now presents with several day history of nausea vomiting fatigue and found to have classic labs consistent with DKA.\" - Dr Woody       Interval Problem Update  Reviewed last 24 hour events:              - Acute Overnight events: None   - Tm: 37.2   - Hr:    - SBP: 101-139   - I/Os: -800   - Vasoactive Drips: None             - Lines/Tubes: pIVs              - DVT Ppx: Heparin   - Nutrition: NPO, Bowel Reg: PRN, GI PPX: N/A   - Glucose control: Insulin gtt   - Abx: Tamiflu   - Mobility: Level 4   - Plan for Today: Cont DKA protocol     Review of Systems  Review of Systems   Neurological:  Positive for headaches.        Vital Signs for last 24 hours   Temp:  [36.6 °C (97.9 °F)-37.2 °C (99 °F)] 36.7 °C (98 °F)  Pulse:  [] 82  Resp:  [16-27] 22  BP: (101-176)/() 107/63  SpO2:  [91 %-98 %] 93 %    Hemodynamic parameters for last 24 hours       Respiratory Information for the last 24 hours       Physical Exam   Physical Exam  Constitutional:       Appearance: Normal appearance. He is normal weight.   Cardiovascular:      Rate and Rhythm: Normal rate and regular rhythm.   Pulmonary:      Effort: Pulmonary effort is normal.      Breath sounds: Normal breath sounds.   Abdominal:      General: Abdomen is flat.      Palpations: Abdomen is soft.   Musculoskeletal:      Right lower leg: No edema.      Left lower leg: No edema.   Skin:     General: Skin is warm and dry.   Neurological:      General: No focal deficit present.      Mental Status: He is oriented to person, place, and time. "         Medications  Current Facility-Administered Medications   Medication Dose Route Frequency Provider Last Rate Last Admin    potassium chloride (KCL) ivpb 10 mEq  10 mEq Intravenous Q HOUR Guanaco Emery M.D.        D10 1/2 NS infusion   Intravenous Continuous Areli Queen V, D.O. 100 mL/hr at 03/21/25 0032 New Bag at 03/21/25 0032    MD ALERT-PHARMACY TO CONSULT FOR DKA MONITORING 1 Each  1 Each Other PRN Areli Queen V, D.O.        magnesium sulfate IVPB premix 2 g  2 g Intravenous Once PRN Areli Queen V, D.O.        Or    magnesium sulfate IVPB premix 4 g  4 g Intravenous Once PRN Areli Queen V, D.O.        Adult DKA potassium(K+) replacement scale  1 Each Intravenous Q4HRS Areli Queen V, D.O.   1 Each at 03/21/25 0600    lactated ringers infusion   Intravenous Continuous Areli Queen V, D.O.   Stopped at 03/21/25 0032    oseltamivir (Tamiflu) capsule 75 mg  75 mg Oral BID Areli Queen V, D.O.   75 mg at 03/21/25 0624    heparin injection 5,000 Units  5,000 Units Subcutaneous Q8HRS Areli Queen V, D.O.   5,000 Units at 03/21/25 0624    acetaminophen (Tylenol) tablet 650 mg  650 mg Oral Q6HRS PRN Areli Queen V, D.O.   650 mg at 03/20/25 2219    senna-docusate (Pericolace Or Senokot S) 8.6-50 MG per tablet 2 Tablet  2 Tablet Oral Q EVENING Areli Queen V, D.O.        And    polyethylene glycol/lytes (Miralax) Packet 1 Packet  1 Packet Oral QDAY PRN Areli Queen V, D.O.        ondansetron (Zofran) syringe/vial injection 4 mg  4 mg Intravenous Q4HRS PRN Areli Queen V, D.O.        ondansetron (Zofran ODT) dispertab 4 mg  4 mg Oral Q4HRS PRN Areli Queen V, D.O.        promethazine (Phenergan) tablet 12.5-25 mg  12.5-25 mg Oral Q4HRS PRN Areli Queen V, D.O.        promethazine (Phenergan) suppository 12.5-25 mg  12.5-25 mg Rectal Q4HRS PRN CHRIS Hunter.O.        prochlorperazine (Compazine) injection 5-10 mg  5-10 mg Intravenous Q4HRS PRN Areli Queen V D.O.        insulin regular (HumuLIN R/NovoLIN R) 100 Units in  mL infusion (DKA)  7  Units/hr Intravenous Continuous Areli Queen V, D.O. 7 mL/hr at 03/21/25 0629 7 Units/hr at 03/21/25 0629       Fluids    Intake/Output Summary (Last 24 hours) at 3/21/2025 0635  Last data filed at 3/21/2025 0600  Gross per 24 hour   Intake 1097.69 ml   Output 1900 ml   Net -802.31 ml       Laboratory  Recent Labs     03/20/25  2353   ISTATTEMP 98.4 F   ISTATFIO2 0   ISTATSPEC Venous         Recent Labs     03/20/25 2200 03/21/25  0143 03/21/25  0524   SODIUM 136 134* 136   POTASSIUM 4.7 4.6 3.8   CHLORIDE 108 113* 112   CO2 4* 6* 11*   BUN 19 19 19   CREATININE 1.17 1.03 0.91   MAGNESIUM 2.2 2.2 2.0   PHOSPHORUS 1.6* 1.6* 2.6   CALCIUM 8.8 8.4* 8.2*     Recent Labs     03/20/25 1834 03/20/25 2200 03/21/25  0143 03/21/25  0524   ALTSGPT 16 13  --   --    ASTSGOT 13 25  --   --    ALKPHOSPHAT 146* 119*  --   --    TBILIRUBIN 0.5 0.3  --   --    LIPASE 39  --   --   --    GLUCOSE 520* 338* 259* 250*     Recent Labs     03/20/25 1834 03/20/25 2200   WBC 12.3* 10.0   NEUTSPOLYS 91.00* 86.30*   LYMPHOCYTES 7.20* 8.60*   MONOCYTES 0.90 2.80   EOSINOPHILS 0.00 0.00   BASOPHILS 0.00 0.50   ASTSGOT 13 25   ALTSGPT 16 13   ALKPHOSPHAT 146* 119*   TBILIRUBIN 0.5 0.3     Recent Labs     03/20/25 1834 03/20/25  2200   RBC 6.25* 5.50   HEMOGLOBIN 18.5* 16.2   HEMATOCRIT 53.5* 48.7   PLATELETCT 459* 334       Imaging  X-Ray:  I have personally reviewed the images and compared with prior images.    Assessment/Plan  * DKA, type 1, not at goal (HCC)- (present on admission)  Assessment & Plan  Suspect new type 1 DM w/ A1C 13.3  No personal or family hx of diabetes or autoimmune disease   Continue insulin gtt per DKA protocol   Diabetes educator       Influenza A- (present on admission)  Assessment & Plan  Cont tamiflu   No significant pneumonia        VTE:  Lovenox  Ulcer: Not Indicated  Lines: None    I have performed a physical exam and reviewed and updated ROS and Plan today (3/21/2025). In review of yesterday's note  (3/20/2025), there are no changes except as documented above.     Discussed patient condition and risk of morbidity and/or mortality with Family, RN, RT, Pharmacy, and Patient  The patient remains critically ill.  Critical care time = 35 minutes in directly providing and coordinating critical care and extensive data review.  No time overlap and excludes procedures.

## 2025-03-21 NOTE — PROGRESS NOTES
Per Dr. Emery D10 1/2 NS rate increased to 125ml/hr due to FSBG 100, MD aware potassium replacement still running and BMP to be drawn after infusion completed

## 2025-03-21 NOTE — ED NOTES
Bedside urinal given to patient. Patient educated to press the call light when ready for staff to collect urine sample.

## 2025-03-21 NOTE — H&P
Critical Care/Pulmonary H&P - Resident    Date of Service: 3/20/2025    Date of Admission:  3/20/2025  6:22 PM    Consulting Physician: Jalen Woody M.D.    Chief Complaint:  Cough, N/V, Abdominal Pain, Constipation, Headache, and Tired      History of Present Illness: Michael Vázquez is a 39 y.o. male with no significant past medical history who presents who has been feeling unwell for the last 4 days with generalized fatigue, nausea and vomiting, and headache. He has also been peeing a lot and feeling a little short of breath and constipated. He has not been able to keep much down. He has recently been feeling ill and was given antibiotics and steroids 2 days ago by urgent care. He denies cough, chest pain, abdominal pain, diarrhea, dysuria. States his 3yo daughter is sick at home. He has no family history of diabetes.    ED Course:   Initial vitals afebrile, , RR 18, /117, 95% on RA. Labs consistent with DKA with glucose 520, bicarb 6, AG 31, BHB 8.95, and A1c 13.8. Respiratory panel positive for flu A. CXR unremarkable. PCT 0.09. UA negative. pH 7.06 on VBG. He received 2L NS, insulin regular 5u, ondansetron, and Tamiflu in the ED.    Review of Systems   Constitutional:  Positive for chills, fever and malaise/fatigue.   Respiratory:  Positive for shortness of breath. Negative for cough.    Cardiovascular:  Negative for chest pain.   Gastrointestinal:  Positive for constipation, nausea and vomiting. Negative for abdominal pain and diarrhea.   Genitourinary:  Negative for dysuria.   Neurological:  Positive for weakness and headaches.       Home Medications       Reviewed by Elijah Rowell (Pharmacy Tech) on 03/20/25 at 2033  Med List Status: Complete     Medication Last Dose Status   brompheniramine-pseudoephedrine-DM 30-2-10 MG/5ML syrup New Rx Active   doxycycline (VIBRAMYCIN) 100 MG Cap 3/20/2025 Active   predniSONE (DELTASONE) 20 MG Tab 3/20/2025 Active                  Audit from  "Redirected Encounters    **Home medications have not yet been reviewed for this encounter**         Social History     Tobacco Use    Smoking status: Never    Smokeless tobacco: Never   Vaping Use    Vaping status: Never Used   Substance Use Topics    Alcohol use: Not Currently     Alcohol/week: 0.6 - 1.2 oz     Types: 1 - 2 Glasses of wine per week    Drug use: No        No past medical history on file.    Past Surgical History:   Procedure Laterality Date    INGUINAL HERNIA REPAIR ROBOTIC XI Bilateral 7/1/2024    Procedure: ROBOTIC BILATERAL INGUINAL HERNIA REPAIR;  Surgeon: Chandrakant Shell M.D.;  Location: SURGERY [facility];  Service: Gen Robotic    NO PERTINENT PAST SURGICAL HISTORY         Allergies: Patient has no known allergies.    No family history on file.    Vitals:    03/20/25 1810 03/20/25 1814 03/20/25 1832 03/20/25 1902   Height: 1.791 m (5' 10.5\")      Weight:  71.2 kg (156 lb 15.5 oz)     Weight % change since last entry.:  0 %     BP: (!) 154/117  (!) 176/96 (!) 151/94   Pulse: (!) 118  92 92   Resp: 18  (!) 24 18   Temp: 36.6 °C (97.9 °F)      TempSrc: Temporal       03/20/25 1946 03/20/25 1950 03/20/25 2004 03/20/25 2009   Height:       Weight:       Weight % change since last entry.:       BP:   (!) 162/92 (!) 150/85   Pulse: 100 91 96 97   Resp: (!) 27 16 19 20   Temp:       TempSrc:        03/20/25 2022 03/20/25 2042   Height:     Weight:     Weight % change since last entry.:     BP: (!) 148/83 (!) 146/77   Pulse: 91 (!) 101   Resp: (!) 24 (!) 21   Temp:     TempSrc:         Physical Examination    Physical Exam  Vitals and nursing note reviewed.   Constitutional:       General: He is not in acute distress.     Appearance: Normal appearance.   HENT:      Head: Normocephalic.      Nose: Nose normal.      Mouth/Throat:      Mouth: Mucous membranes are dry.   Eyes:      General: No scleral icterus.     Pupils: Pupils are equal, round, and reactive to light.   Cardiovascular:      Rate and " Rhythm: Normal rate.      Pulses: Normal pulses.   Pulmonary:      Effort: Pulmonary effort is normal. No respiratory distress.      Breath sounds: Normal breath sounds. No wheezing or rales.   Abdominal:      General: Abdomen is flat. Bowel sounds are normal. There is no distension.      Palpations: Abdomen is soft.   Musculoskeletal:         General: No swelling.      Cervical back: Normal range of motion.   Skin:     General: Skin is warm and dry.      Capillary Refill: Capillary refill takes less than 2 seconds.   Neurological:      General: No focal deficit present.      Mental Status: He is alert and oriented to person, place, and time.   Psychiatric:         Mood and Affect: Mood normal.          No intake or output data in the 24 hours ending 03/20/25 2116    Recent Labs     03/20/25  1834   WBC 12.3*   NEUTSPOLYS 91.00*   LYMPHOCYTES 7.20*   MONOCYTES 0.90   EOSINOPHILS 0.00   BASOPHILS 0.00   ASTSGOT 13   ALTSGPT 16   ALKPHOSPHAT 146*   TBILIRUBIN 0.5     Recent Labs     03/20/25  1834   SODIUM 135   POTASSIUM 4.8   CHLORIDE 98   CO2 6*   BUN 21   CREATININE 1.46*   MAGNESIUM 2.3   PHOSPHORUS 3.6   CALCIUM 10.3     Recent Labs     03/20/25  1834   ALTSGPT 16   ASTSGOT 13   ALKPHOSPHAT 146*   TBILIRUBIN 0.5   LIPASE 39   GLUCOSE 520*         DX-CHEST-PORTABLE (1 VIEW)   Final Result         1. No acute cardiopulmonary abnormalities are identified.          Patient Active Problem List   Diagnosis    Postoperative pain    DKA, type 1, not at goal (HCC)    Influenza A    Acute kidney injury (HCC)    Leukocytosis    Constipation    Polycythemia       Assessment and Plan:    * DKA, type 1, not at goal (HCC)  Assessment & Plan  Glucose 520, bicarb 6, AG 31, BHB 8.95. New onset diabetic, A1c 13, with recent URI treated with few days of steroids. At his age, unclear whether T2 or T1DM.    -DKA protocol  -Insulin Abs: BASSEM, IAA, IA-2, C-peptide  -Prn antiemetics, pain mgmt  -NPO, ice chips ok  -Diabetes education in  the morning  -Will likely need insulin on discharge until insulin Abs return    Acute kidney injury (HCC)  Assessment & Plan  Baseline sCr ~1, up to 1.49 on admission. Likely pre-renal due to glucosuria.    -Fluid resuscitate per DKA protocol  -Avoid nephrotoxins and renally dose meds    Influenza A  Assessment & Plan  Patient reports subjective shortness of breath, not requiring oxygen. CXR clear. PCT 0.09. Started on Tamiflu in the ED.    -Tamiflu    Polycythemia  Assessment & Plan  Likely due to hemoconcentration due to glucosuria, trend    Constipation  Assessment & Plan  Bowel protocol as needed    Leukocytosis  Assessment & Plan  Likely reactive, monitor       Quality measures:  Feeding:  npo with ice chips  Analgesia: as needed tylenol  Sedation: n/a  Thromboprophylaxis: heparin  Head of bed: >30 degrees  Ulcer prophylaxis: not indicated  Glycemic control: per dka protocol  Bowel care: as needed  Indwelling lines: pIVs  Deescalation of antibiotics: n/a      CODE STATUS:   Full code  DISPO:    Rasheeda KINSEY D.O.  PGY-2 Internal Medicine Resident  [facility]

## 2025-03-22 PROBLEM — E83.39 HYPOPHOSPHATEMIA: Status: ACTIVE | Noted: 2025-03-22

## 2025-03-22 LAB
ANION GAP SERPL CALC-SCNC: 11 MMOL/L (ref 7–16)
ANION GAP SERPL CALC-SCNC: 12 MMOL/L (ref 7–16)
B-OH-BUTYR SERPL-MCNC: 0.18 MMOL/L (ref 0.02–0.27)
BUN SERPL-MCNC: 10 MG/DL (ref 8–22)
BUN SERPL-MCNC: 12 MG/DL (ref 8–22)
C PEPTIDE SERPL-MCNC: 0.4 NG/ML (ref 0.5–3.3)
CALCIUM SERPL-MCNC: 7.6 MG/DL (ref 8.5–10.5)
CALCIUM SERPL-MCNC: 8.1 MG/DL (ref 8.5–10.5)
CHLORIDE SERPL-SCNC: 106 MMOL/L (ref 96–112)
CHLORIDE SERPL-SCNC: 107 MMOL/L (ref 96–112)
CO2 SERPL-SCNC: 15 MMOL/L (ref 20–33)
CO2 SERPL-SCNC: 18 MMOL/L (ref 20–33)
CREAT SERPL-MCNC: 0.61 MG/DL (ref 0.5–1.4)
CREAT SERPL-MCNC: 0.67 MG/DL (ref 0.5–1.4)
GFR SERPLBLD CREATININE-BSD FMLA CKD-EPI: 120 ML/MIN/1.73 M 2
GFR SERPLBLD CREATININE-BSD FMLA CKD-EPI: 124 ML/MIN/1.73 M 2
GLUCOSE BLD STRIP.AUTO-MCNC: 110 MG/DL (ref 65–99)
GLUCOSE BLD STRIP.AUTO-MCNC: 114 MG/DL (ref 65–99)
GLUCOSE BLD STRIP.AUTO-MCNC: 128 MG/DL (ref 65–99)
GLUCOSE BLD STRIP.AUTO-MCNC: 136 MG/DL (ref 65–99)
GLUCOSE BLD STRIP.AUTO-MCNC: 141 MG/DL (ref 65–99)
GLUCOSE BLD STRIP.AUTO-MCNC: 148 MG/DL (ref 65–99)
GLUCOSE BLD STRIP.AUTO-MCNC: 168 MG/DL (ref 65–99)
GLUCOSE BLD STRIP.AUTO-MCNC: 198 MG/DL (ref 65–99)
GLUCOSE BLD STRIP.AUTO-MCNC: 225 MG/DL (ref 65–99)
GLUCOSE BLD STRIP.AUTO-MCNC: 225 MG/DL (ref 65–99)
GLUCOSE BLD STRIP.AUTO-MCNC: 229 MG/DL (ref 65–99)
GLUCOSE BLD STRIP.AUTO-MCNC: 241 MG/DL (ref 65–99)
GLUCOSE BLD STRIP.AUTO-MCNC: 243 MG/DL (ref 65–99)
GLUCOSE BLD STRIP.AUTO-MCNC: 264 MG/DL (ref 65–99)
GLUCOSE BLD STRIP.AUTO-MCNC: 267 MG/DL (ref 65–99)
GLUCOSE BLD STRIP.AUTO-MCNC: 296 MG/DL (ref 65–99)
GLUCOSE BLD STRIP.AUTO-MCNC: 87 MG/DL (ref 65–99)
GLUCOSE BLD STRIP.AUTO-MCNC: 89 MG/DL (ref 65–99)
GLUCOSE BLD STRIP.AUTO-MCNC: 90 MG/DL (ref 65–99)
GLUCOSE BLD STRIP.AUTO-MCNC: 94 MG/DL (ref 65–99)
GLUCOSE SERPL-MCNC: 227 MG/DL (ref 65–99)
GLUCOSE SERPL-MCNC: 236 MG/DL (ref 65–99)
LACTATE SERPL-SCNC: 0.9 MMOL/L (ref 0.5–2)
LACTATE SERPL-SCNC: 1.8 MMOL/L (ref 0.5–2)
MAGNESIUM SERPL-MCNC: 1.9 MG/DL (ref 1.5–2.5)
MAGNESIUM SERPL-MCNC: 2 MG/DL (ref 1.5–2.5)
MAGNESIUM SERPL-MCNC: 2.1 MG/DL (ref 1.5–2.5)
PHOSPHATE SERPL-MCNC: 1.9 MG/DL (ref 2.5–4.5)
PHOSPHATE SERPL-MCNC: 2.7 MG/DL (ref 2.5–4.5)
PHOSPHATE SERPL-MCNC: 3.1 MG/DL (ref 2.5–4.5)
POTASSIUM SERPL-SCNC: 3.3 MMOL/L (ref 3.6–5.5)
POTASSIUM SERPL-SCNC: 3.5 MMOL/L (ref 3.6–5.5)
SODIUM SERPL-SCNC: 134 MMOL/L (ref 135–145)
SODIUM SERPL-SCNC: 135 MMOL/L (ref 135–145)

## 2025-03-22 PROCEDURE — 700105 HCHG RX REV CODE 258

## 2025-03-22 PROCEDURE — 770001 HCHG ROOM/CARE - MED/SURG/GYN PRIV*

## 2025-03-22 PROCEDURE — 700102 HCHG RX REV CODE 250 W/ 637 OVERRIDE(OP)

## 2025-03-22 PROCEDURE — A9270 NON-COVERED ITEM OR SERVICE: HCPCS | Performed by: STUDENT IN AN ORGANIZED HEALTH CARE EDUCATION/TRAINING PROGRAM

## 2025-03-22 PROCEDURE — 86337 INSULIN ANTIBODIES: CPT

## 2025-03-22 PROCEDURE — 700102 HCHG RX REV CODE 250 W/ 637 OVERRIDE(OP): Performed by: STUDENT IN AN ORGANIZED HEALTH CARE EDUCATION/TRAINING PROGRAM

## 2025-03-22 PROCEDURE — 80048 BASIC METABOLIC PNL TOTAL CA: CPT | Mod: 91

## 2025-03-22 PROCEDURE — 700105 HCHG RX REV CODE 258: Performed by: STUDENT IN AN ORGANIZED HEALTH CARE EDUCATION/TRAINING PROGRAM

## 2025-03-22 PROCEDURE — 86341 ISLET CELL ANTIBODY: CPT | Mod: 91

## 2025-03-22 PROCEDURE — 700111 HCHG RX REV CODE 636 W/ 250 OVERRIDE (IP): Mod: JZ | Performed by: STUDENT IN AN ORGANIZED HEALTH CARE EDUCATION/TRAINING PROGRAM

## 2025-03-22 PROCEDURE — 83735 ASSAY OF MAGNESIUM: CPT | Mod: 91

## 2025-03-22 PROCEDURE — 99222 1ST HOSP IP/OBS MODERATE 55: CPT | Performed by: HOSPITALIST

## 2025-03-22 PROCEDURE — 84100 ASSAY OF PHOSPHORUS: CPT

## 2025-03-22 PROCEDURE — 82010 KETONE BODYS QUAN: CPT

## 2025-03-22 PROCEDURE — 82962 GLUCOSE BLOOD TEST: CPT | Mod: 91

## 2025-03-22 PROCEDURE — 700101 HCHG RX REV CODE 250

## 2025-03-22 PROCEDURE — 83605 ASSAY OF LACTIC ACID: CPT

## 2025-03-22 PROCEDURE — A9270 NON-COVERED ITEM OR SERVICE: HCPCS

## 2025-03-22 PROCEDURE — 700111 HCHG RX REV CODE 636 W/ 250 OVERRIDE (IP)

## 2025-03-22 RX ORDER — DEXTROSE MONOHYDRATE 25 G/50ML
25 INJECTION, SOLUTION INTRAVENOUS
Status: DISCONTINUED | OUTPATIENT
Start: 2025-03-22 | End: 2025-03-23 | Stop reason: HOSPADM

## 2025-03-22 RX ORDER — POTASSIUM CHLORIDE 7.45 MG/ML
10 INJECTION INTRAVENOUS
Status: COMPLETED | OUTPATIENT
Start: 2025-03-22 | End: 2025-03-22

## 2025-03-22 RX ORDER — SODIUM CHLORIDE 9 MG/ML
INJECTION, SOLUTION INTRAVENOUS CONTINUOUS
Status: DISCONTINUED | OUTPATIENT
Start: 2025-03-22 | End: 2025-03-23 | Stop reason: HOSPADM

## 2025-03-22 RX ORDER — INSULIN LISPRO 100 [IU]/ML
3-14 INJECTION, SOLUTION INTRAVENOUS; SUBCUTANEOUS
Status: DISCONTINUED | OUTPATIENT
Start: 2025-03-22 | End: 2025-03-23 | Stop reason: HOSPADM

## 2025-03-22 RX ORDER — DEXTROSE, SODIUM CHLORIDE, SODIUM LACTATE, POTASSIUM CHLORIDE, AND CALCIUM CHLORIDE 5; .6; .31; .03; .02 G/100ML; G/100ML; G/100ML; G/100ML; G/100ML
INJECTION, SOLUTION INTRAVENOUS CONTINUOUS
Status: ACTIVE | OUTPATIENT
Start: 2025-03-22 | End: 2025-03-22

## 2025-03-22 RX ORDER — POTASSIUM CHLORIDE 7.45 MG/ML
10 INJECTION INTRAVENOUS
Status: DISCONTINUED | OUTPATIENT
Start: 2025-03-22 | End: 2025-03-22

## 2025-03-22 RX ORDER — SODIUM BICARBONATE IN D5W 150/1000ML
PLASTIC BAG, INJECTION (ML) INTRAVENOUS CONTINUOUS
Status: DISPENSED | OUTPATIENT
Start: 2025-03-22 | End: 2025-03-22

## 2025-03-22 RX ADMIN — DIBASIC SODIUM PHOSPHATE, MONOBASIC POTASSIUM PHOSPHATE AND MONOBASIC SODIUM PHOSPHATE 500 MG: 852; 155; 130 TABLET ORAL at 08:32

## 2025-03-22 RX ADMIN — MAGNESIUM SULFATE HEPTAHYDRATE 2 G: 2 INJECTION, SOLUTION INTRAVENOUS at 01:22

## 2025-03-22 RX ADMIN — OSELTAMIVIR PHOSPHATE 75 MG: 75 CAPSULE ORAL at 05:27

## 2025-03-22 RX ADMIN — OSELTAMIVIR PHOSPHATE 75 MG: 75 CAPSULE ORAL at 16:56

## 2025-03-22 RX ADMIN — THIAMINE HYDROCHLORIDE 200 MG: 100 INJECTION, SOLUTION INTRAMUSCULAR; INTRAVENOUS at 15:54

## 2025-03-22 RX ADMIN — POTASSIUM CHLORIDE 10 MEQ: 7.46 INJECTION, SOLUTION INTRAVENOUS at 08:38

## 2025-03-22 RX ADMIN — SODIUM CHLORIDE: 9 INJECTION, SOLUTION INTRAVENOUS at 18:09

## 2025-03-22 RX ADMIN — INSULIN LISPRO 7 UNITS: 100 INJECTION, SOLUTION INTRAVENOUS; SUBCUTANEOUS at 16:59

## 2025-03-22 RX ADMIN — POTASSIUM PHOSPHATE, MONOBASIC AND POTASSIUM PHOSPHATE, DIBASIC 30 MMOL: 224; 236 INJECTION, SOLUTION, CONCENTRATE INTRAVENOUS at 15:09

## 2025-03-22 RX ADMIN — ENOXAPARIN SODIUM 40 MG: 100 INJECTION SUBCUTANEOUS at 16:56

## 2025-03-22 RX ADMIN — INSULIN LISPRO 4 UNITS: 100 INJECTION, SOLUTION INTRAVENOUS; SUBCUTANEOUS at 21:23

## 2025-03-22 RX ADMIN — SODIUM BICARBONATE: 84 INJECTION, SOLUTION INTRAVENOUS at 01:15

## 2025-03-22 RX ADMIN — INSULIN GLARGINE-YFGN 20 UNITS: 100 INJECTION, SOLUTION SUBCUTANEOUS at 13:36

## 2025-03-22 RX ADMIN — POTASSIUM CHLORIDE 10 MEQ: 7.46 INJECTION, SOLUTION INTRAVENOUS at 10:51

## 2025-03-22 RX ADMIN — SODIUM CHLORIDE, SODIUM LACTATE, POTASSIUM CHLORIDE, CALCIUM CHLORIDE AND DEXTROSE MONOHYDRATE: 5; 600; 310; 30; 20 INJECTION, SOLUTION INTRAVENOUS at 05:26

## 2025-03-22 RX ADMIN — POTASSIUM CHLORIDE 10 MEQ: 7.46 INJECTION, SOLUTION INTRAVENOUS at 09:46

## 2025-03-22 RX ADMIN — POTASSIUM CHLORIDE 10 MEQ: 7.46 INJECTION, SOLUTION INTRAVENOUS at 00:28

## 2025-03-22 RX ADMIN — THIAMINE HYDROCHLORIDE 200 MG: 100 INJECTION, SOLUTION INTRAMUSCULAR; INTRAVENOUS at 21:16

## 2025-03-22 RX ADMIN — POTASSIUM CHLORIDE 10 MEQ: 7.46 INJECTION, SOLUTION INTRAVENOUS at 12:19

## 2025-03-22 RX ADMIN — THIAMINE HYDROCHLORIDE 200 MG: 100 INJECTION, SOLUTION INTRAMUSCULAR; INTRAVENOUS at 05:27

## 2025-03-22 ASSESSMENT — ENCOUNTER SYMPTOMS
MUSCULOSKELETAL NEGATIVE: 1
CARDIOVASCULAR NEGATIVE: 1
COUGH: 0
FEVER: 0
VOMITING: 0
CONSTITUTIONAL NEGATIVE: 1
NAUSEA: 0
NEUROLOGICAL NEGATIVE: 1
RESPIRATORY NEGATIVE: 1
PALPITATIONS: 0
BACK PAIN: 0
SHORTNESS OF BREATH: 0
ABDOMINAL PAIN: 0
CHILLS: 0
PSYCHIATRIC NEGATIVE: 1
DIZZINESS: 0
EYES NEGATIVE: 1
GASTROINTESTINAL NEGATIVE: 1
DIARRHEA: 0
HEADACHES: 0
LOSS OF CONSCIOUSNESS: 0

## 2025-03-22 ASSESSMENT — PAIN DESCRIPTION - PAIN TYPE
TYPE: ACUTE PAIN

## 2025-03-22 ASSESSMENT — FIBROSIS 4 INDEX: FIB4 SCORE: 0.85

## 2025-03-22 NOTE — PROGRESS NOTES
"Critical Care Progress Note    Date of admission  3/20/2025    Chief Complaint  39 y.o. male admitted 3/20/2025 with DKA    Hospital Course  \"39-year-old male with no history of diabetes in himself or family who has multiple children at home 1 of which had a cold in the last few weeks and he secondarily picked it up last week and was seen at urgent care where he was started on steroids and a Z-Buddy and now presents with several day history of nausea vomiting fatigue and found to have classic labs consistent with DKA.\" - Dr Woody       Interval Problem Update  Reviewed last 24 hour events:              - Acute Overnight events: None   - Tm: AFeb   - Hr: 68-90   - SBP: 114-138   - I/Os: +3.7L    - Vasoactive Drips: None              - Lines/Tubes: pIVs              - DVT Ppx: Lovenox   - Nutrition: NPO   - Glucose control: Insulin gtt   - Abx: Tamiflu   - Mobility: 4   - Plan for Today: Transition when ready    Review of Systems  Review of Systems   All other systems reviewed and are negative.       Vital Signs for last 24 hours   Temp:  [36.5 °C (97.7 °F)-37.1 °C (98.8 °F)] 37.1 °C (98.8 °F)  Pulse:  [64-90] 77  Resp:  [12-27] 27  BP: (109-140)/(55-87) 114/72  SpO2:  [93 %-96 %] 94 %    Hemodynamic parameters for last 24 hours       Respiratory Information for the last 24 hours       Physical Exam   Physical Exam  Constitutional:       Comments: Well appearing young male resting supine in bed in NAD   Cardiovascular:      Rate and Rhythm: Normal rate and regular rhythm.   Pulmonary:      Effort: Pulmonary effort is normal.      Breath sounds: Normal breath sounds.   Abdominal:      General: Abdomen is flat.      Palpations: Abdomen is soft.   Musculoskeletal:      Right lower leg: No edema.      Left lower leg: No edema.   Neurological:      General: No focal deficit present.      Mental Status: He is oriented to person, place, and time.         Medications  Current Facility-Administered Medications   Medication " Dose Route Frequency Provider Last Rate Last Admin    D5LR infusion   Intravenous Continuous Areli Queen V, D.O. 150 mL/hr at 03/22/25 0526 New Bag at 03/22/25 0526    butalbital/apap/caffeine (Fioricet) -40 mg per tablet 1 Tablet  1 Tablet Oral Q6HRS PRN Guanaco Emery M.D.   1 Tablet at 03/21/25 2318    enoxaparin (Lovenox) inj 40 mg  40 mg Subcutaneous DAILY AT 1800 Guanaco Emery M.D.   40 mg at 03/21/25 1714    phosphorus (K-Phos-Neutral) per tablet 500 mg  500 mg Oral 4X/DAY Guanaco Emery M.D.   500 mg at 03/21/25 2051    thiamine (B-1) injection 200 mg  200 mg Intravenous Q8HRS Areli Queen V, D.O.   200 mg at 03/22/25 0527    MD ALERT-PHARMACY TO CONSULT FOR DKA MONITORING 1 Each  1 Each Other PRN Areli Queen V, D.O.        Adult DKA potassium(K+) replacement scale  1 Each Intravenous Q4HRS Areli Queen V, D.O.   1 Each at 03/21/25 2200    lactated ringers infusion   Intravenous Continuous Areli Bustamanteu V, D.O.   Stopped at 03/21/25 0029    oseltamivir (Tamiflu) capsule 75 mg  75 mg Oral BID Areliarturo Queen V, D.O.   75 mg at 03/22/25 0527    acetaminophen (Tylenol) tablet 650 mg  650 mg Oral Q6HRS PRN Areli Queen V, D.O.   650 mg at 03/20/25 2219    senna-docusate (Pericolace Or Senokot S) 8.6-50 MG per tablet 2 Tablet  2 Tablet Oral Q EVENING Areli Bustamanteu V, D.O.   2 Tablet at 03/21/25 1715    And    polyethylene glycol/lytes (Miralax) Packet 1 Packet  1 Packet Oral QDAY PRN Areli Queen V, D.O.   1 Packet at 03/21/25 0647    ondansetron (Zofran) syringe/vial injection 4 mg  4 mg Intravenous Q4HRS PRN Areli Queen V, D.O.        ondansetron (Zofran ODT) dispertab 4 mg  4 mg Oral Q4HRS PRN Areli Queen V, D.O.        promethazine (Phenergan) tablet 12.5-25 mg  12.5-25 mg Oral Q4HRS PRN Areli Queen V, D.O.        promethazine (Phenergan) suppository 12.5-25 mg  12.5-25 mg Rectal Q4HRS PRN Areli Queen V, D.O.        prochlorperazine (Compazine) injection 5-10 mg  5-10 mg Intravenous Q4HRS PRN Areli Queen V, D.O.        insulin  regular (HumuLIN R/NovoLIN R) 100 Units in  mL infusion (DKA)  2 Units/hr Intravenous Continuous Areli Queen V, D.O. 1 mL/hr at 03/22/25 0520 1 Units/hr at 03/22/25 0520       Fluids    Intake/Output Summary (Last 24 hours) at 3/22/2025 0639  Last data filed at 3/22/2025 0600  Gross per 24 hour   Intake 5036.18 ml   Output 1275 ml   Net 3761.18 ml       Laboratory  Recent Labs     03/20/25  2353   ISTATTEMP 98.4 F   ISTATFIO2 0   ISTATSPEC Venous         Recent Labs     03/21/25  1035 03/21/25  1617 03/21/25  2259 03/22/25  0514   SODIUM 137 137 135  --    POTASSIUM 3.4* 3.5* 4.6  --    CHLORIDE 111 114* 114*  --    CO2 14* 13* 13*  --    BUN 19 18 16  --    CREATININE 0.83 0.74 0.62  --    MAGNESIUM 1.9 2.4 1.9 2.1   PHOSPHORUS 1.9* 1.2* 3.6 3.1   CALCIUM 8.3* 8.4* 7.9*  --      Recent Labs     03/20/25  1834 03/20/25  2200 03/21/25  0143 03/21/25 1035 03/21/25  1617 03/21/25 2259   ALTSGPT 16 13  --   --   --   --    ASTSGOT 13 25  --   --   --   --    ALKPHOSPHAT 146* 119*  --   --   --   --    TBILIRUBIN 0.5 0.3  --   --   --   --    LIPASE 39  --   --   --   --   --    GLUCOSE 520* 338*   < > 175* 92 96    < > = values in this interval not displayed.     Recent Labs     03/20/25 1834 03/20/25 2200   WBC 12.3* 10.0   NEUTSPOLYS 91.00* 86.30*   LYMPHOCYTES 7.20* 8.60*   MONOCYTES 0.90 2.80   EOSINOPHILS 0.00 0.00   BASOPHILS 0.00 0.50   ASTSGOT 13 25   ALTSGPT 16 13   ALKPHOSPHAT 146* 119*   TBILIRUBIN 0.5 0.3     Recent Labs     03/20/25 1834 03/20/25  2200   RBC 6.25* 5.50   HEMOGLOBIN 18.5* 16.2   HEMATOCRIT 53.5* 48.7   PLATELETCT 459* 334       Imaging  X-Ray:  I have personally reviewed the images and compared with prior images.    Assessment/Plan  * DKA, type 1, not at goal (HCC)- (present on admission)  Assessment & Plan  Suspect new type 1 DM w/ A1C 13.3  No personal or family hx of diabetes or autoimmune disease   Continue insulin gtt per DKA protocol   Diabetes educator       Generalized  headaches  Assessment & Plan  Tylenol and fioricet PRN     Influenza A- (present on admission)  Assessment & Plan  Cont tamiflu   No significant pneumonia         VTE:  Lovenox  Ulcer: Not Indicated  Lines: None    I have performed a physical exam and reviewed and updated ROS and Plan today (3/22/2025). In review of yesterday's note (3/21/2025), there are no changes except as documented above.     Discussed patient condition and risk of morbidity and/or mortality with RN, RT, Pharmacy, Charge nurse / hot rounds, and Patient  The patient remains critically ill.  Critical care time = 35 minutes in directly providing and coordinating critical care and extensive data review.  No time overlap and excludes procedures.

## 2025-03-22 NOTE — CARE PLAN
Problem: Pain - Standard  Goal: Alleviation of pain or a reduction in pain to the patient’s comfort goal  Outcome: Progressing     Problem: Knowledge Deficit - Standard  Goal: Patient and family/care givers will demonstrate understanding of plan of care, disease process/condition, diagnostic tests and medications  Outcome: Progressing     Problem: Diabetes Management - DKA  Goal: Patient will achieve and maintain glucose in satisfactory range  Outcome: Progressing     Problem: Knowledge Deficit - Diabetes  Goal: Patient will demonstrate knowledge of insulin injection, symptoms, and treatment of hypoglycemia and diet prior to discharge  Outcome: Progressing   The patient is Stable - Low risk of patient condition declining or worsening    Shift Goals  Clinical Goals: transition off dka, rest, eat  Patient Goals: eat  Family Goals: eladia    Progress made toward(s) clinical / shift goals:  transitioned off insulin gtt, eating, tolerating mobility, resting, all needs met. Pt waiting to shower.     Patient is not progressing towards the following goals:

## 2025-03-22 NOTE — PROGRESS NOTES
Updated Dr Queen, patient's glucose increasing to 179, d101/2NS at 125mL/hr, per MD decrease fluids to 100mL/hr.

## 2025-03-22 NOTE — PROGRESS NOTES
Notified Dr. Schwab of patients BG of 225. Per Dr. Schwab leave insulin gtt at current rate, as well as fluids, pending CMP results.

## 2025-03-22 NOTE — CONSULTS
Hospital Medicine Consultation    Date of Service  3/22/2025    Referring Physician  Guanaco Emery M.D.    Consulting Physician  Andrei Jamison D.O.    Reason for Consultation  DKA/new Dx Dm    History of Presenting Illness  39 y.o. male who presented 3/20/2025 with no previous Hx of DM who presented with malaise, N/V.  In the ED influenza A pos, , HCO3 6, AG 31, BHB 8.95.  Admitted to the ICU with a new Dx of DM and DKA.    On my examination pt reports he's feeling much better than he did before he got to the hospital, in fact he feels better than he has in some time.  No N or V.  Has a good appetite.  Doesn't feel dehydrated as he had been    Review of Systems  Review of Systems   Constitutional:  Negative for chills and fever.   Respiratory:  Negative for cough and shortness of breath.    Cardiovascular:  Negative for chest pain, palpitations and leg swelling.   Gastrointestinal:  Negative for abdominal pain, diarrhea, nausea and vomiting.   Genitourinary:  Negative for dysuria and urgency.   Musculoskeletal:  Negative for back pain.   Skin:  Negative for rash.   Neurological:  Negative for dizziness, loss of consciousness and headaches.       Past Medical History   has no past medical history on file.    Surgical History   has a past surgical history that includes no pertinent past surgical history and inguinal hernia repair robotic xi (Bilateral, 7/1/2024).    Family History  family history is not on file.    Social History   reports that he has never smoked. He has never used smokeless tobacco. He reports that he does not currently use alcohol after a past usage of about 0.6 - 1.2 oz of alcohol per week. He reports that he does not use drugs.    Medications  Prior to Admission Medications   Prescriptions Last Dose Informant Patient Reported? Taking?   brompheniramine-pseudoephedrine-DM 30-2-10 MG/5ML syrup New Rx Patient Yes Yes   Sig: NOT TAKING   doxycycline (VIBRAMYCIN) 100 MG Cap  3/20/2025 Morning Patient No Yes   Sig: Take 1 Capsule by mouth 2 times a day for 7 days.   predniSONE (DELTASONE) 20 MG Tab 3/20/2025 Morning Patient No Yes   Sig: Take 2 tabs daily in the morning with food x five days.   Patient taking differently: Take 40 mg by mouth every day. Take 2 tabs daily in the morning with food x five days.   40 mg = 2 tablets      Facility-Administered Medications: None       Allergies  No Known Allergies    Physical Exam  Temp:  [36.5 °C (97.7 °F)-37.1 °C (98.8 °F)] 36.9 °C (98.4 °F)  Pulse:  [63-88] 88  Resp:  [12-27] 16  BP: (109-127)/(61-79) 126/79  SpO2:  [93 %-96 %] 95 %    Physical Exam  Constitutional:       General: He is not in acute distress.     Appearance: He is well-developed. He is not diaphoretic.   HENT:      Head: Normocephalic and atraumatic.   Eyes:      Conjunctiva/sclera: Conjunctivae normal.   Neck:      Vascular: No JVD.   Cardiovascular:      Rate and Rhythm: Normal rate.      Heart sounds: No murmur heard.     No gallop.   Pulmonary:      Effort: Pulmonary effort is normal. No respiratory distress.      Breath sounds: No stridor. No wheezing or rales.   Abdominal:      Palpations: Abdomen is soft.      Tenderness: There is no abdominal tenderness. There is no guarding or rebound.   Skin:     General: Skin is warm and dry.      Findings: No rash.   Neurological:      Mental Status: He is oriented to person, place, and time.   Psychiatric:         Thought Content: Thought content normal.         Fluids  Date 03/22/25 0700 - 03/23/25 0659   Shift 2405-3470 [id number] 8493-3075 24 Hour Total   INTAKE   Shift Total       OUTPUT   Urine 645   645   Shift Total 645   645   Weight (kg) 71.3 71.3 71.3 71.3       Laboratory  Recent Labs     03/20/25  1834 03/20/25  2200   WBC 12.3* 10.0   RBC 6.25* 5.50   HEMOGLOBIN 18.5* 16.2   HEMATOCRIT 53.5* 48.7   MCV 85.6 88.5   MCH 29.6 29.5   MCHC 34.6 33.3   RDW 38.6 39.6   PLATELETCT 459* 334   MPV 9.0 8.7*     Recent Labs      03/21/25  2259 03/22/25  0651 03/22/25  1216   SODIUM 135 134* 135   POTASSIUM 4.6 3.3* 3.5*   CHLORIDE 114* 107 106   CO2 13* 15* 18*   GLUCOSE 96 236* 227*   BUN 16 12 10   CREATININE 0.62 0.61 0.67   CALCIUM 7.9* 7.6* 8.1*                     Imaging  DX-CHEST-PORTABLE (1 VIEW)   Final Result         1. No acute cardiopulmonary abnormalities are identified.          Assessment/Plan  * DKA, type 1, not at goal (HCC)- (present on admission)  Assessment & Plan  New Dx  Pt had a hernia surgery in July and his BG at that time was>300 but he reports he wasn't told to do anything about it  A1c 13.8  Likely type 1 thought lab work up pending  Starting glargine 20units and short acting protocol  Diabetic educator consulted  Ab's sent  Qid BG  Hypoglycemia protocol in place    Hypophosphatemia  Assessment & Plan  Follow and replace IV    Polycythemia- (present on admission)  Assessment & Plan  Hemoconcentration  Resolved with resuscitation    Constipation- (present on admission)  Assessment & Plan  Supportive care    Leukocytosis- (present on admission)  Assessment & Plan  No evidence of acute infection  Stress demargination    Acute kidney injury (HCC)- (present on admission)  Assessment & Plan  Resolved with resuscitation    Influenza A- (present on admission)  Assessment & Plan  Oseltamivir

## 2025-03-22 NOTE — CARE PLAN
The patient is Watcher - Medium risk of patient condition declining or worsening    Shift Goals  Clinical Goals: come off of DKA,  Patient Goals: DKA, eat  Family Goals: EDMUNDO    Progress made toward(s) clinical / shift goals:    Problem: Pain - Standard  Goal: Alleviation of pain or a reduction in pain to the patient’s comfort goal  Outcome: Progressing     Problem: Knowledge Deficit - Standard  Goal: Patient and family/care givers will demonstrate understanding of plan of care, disease process/condition, diagnostic tests and medications  Outcome: Progressing     Problem: Diabetes Management - DKA  Goal: Patient will achieve and maintain glucose in satisfactory range  Outcome: Progressing     Problem: Fluid Balance or Risk for Fluid Volume Deficit  Goal: Patient will demonstrate adequate hydration and vital signs  Outcome: Progressing     Problem: Neuro Status  Goal: Neuro status will remain stable or improve  Outcome: Progressing     Problem: Fall Risk  Goal: Patient will remain free from falls  Outcome: Progressing

## 2025-03-22 NOTE — PROGRESS NOTES
[facility] Internal Medicine Daily Progress Note    Date of Service  3/22/2025    [facility] Team: [facility] ICU Gold Team   Attending: Guanaco Emery M.d.  Senior Resident: Dr. Schwab  Contact Number: [phone]    Chief Complaint  Michael Vázquez is a 39 y.o. male admitted 3/20/2025 with DKA, influenza A.    Hospital Course  Michael Vázquez is a 39 y.o. male with no significant past medical history who presents who has been feeling unwell for the last 4 days with generalized fatigue, nausea and vomiting, and headache. He has also been peeing a lot and feeling a little short of breath and constipated. He has not been able to keep much down. He has recently been feeling ill and was given antibiotics and steroids 2 days ago by urgent care. He denies cough, chest pain, abdominal pain, diarrhea, dysuria. States his 3yo daughter is sick at home. He has no family history of diabetes.     3/21: DKA protocol  3/22: Approaching resolution; pending goal HCO3 prior to transitioning to lantus.    Interval Problem Update  NAEO; fluids were changed from D10 1/2 NS-> Sodium bicarb -> D5LR in an attempt to resolve acidosis. Anion gap closed, NAGMA currently.  -T Afebrile  -P 80s  -SBP 110s  -RA 94%  -DVT Lovenox  -GI- N/a  -Abx: Tamiflu  -Vent: N/a  -Drips: Insulin @ 1-3 U/h, D5LR  -Lines: PIVs  -IO: 1275 UOP      I have discussed this patient's plan of care and discharge plan at IDT rounds today with Case Management, Nursing, Nursing leadership, and other members of the IDT team.    Consultants/Specialty  critical care    Code Status  Full Code    Disposition  The patient is not medically cleared for discharge to home or a post-acute facility.  Anticipate discharge to: home with close outpatient follow-up    I have placed the appropriate orders for post-discharge needs.    Review of Systems  Review of Systems   Constitutional: Negative.    HENT: Negative.     Eyes: Negative.    Respiratory: Negative.     Cardiovascular: Negative.     Gastrointestinal: Negative.    Genitourinary: Negative.    Musculoskeletal: Negative.    Skin: Negative.    Neurological: Negative.    Endo/Heme/Allergies: Negative.    Psychiatric/Behavioral: Negative.          Physical Exam  Temp:  [36.5 °C (97.7 °F)-37.1 °C (98.8 °F)] 37.1 °C (98.8 °F)  Pulse:  [63-86] 75  Resp:  [12-27] 15  BP: (109-140)/(61-78) 124/66  SpO2:  [93 %-96 %] 95 %    Physical Exam  Constitutional:       General: He is not in acute distress.     Appearance: Normal appearance. He is normal weight. He is not ill-appearing, toxic-appearing or diaphoretic.   HENT:      Right Ear: External ear normal.      Left Ear: External ear normal.      Nose: Nose normal.      Mouth/Throat:      Mouth: Mucous membranes are moist.   Eyes:      Extraocular Movements: Extraocular movements intact.      Conjunctiva/sclera: Conjunctivae normal.   Cardiovascular:      Rate and Rhythm: Normal rate and regular rhythm.      Pulses: Normal pulses.      Heart sounds: Normal heart sounds. No murmur heard.     No friction rub. No gallop.   Pulmonary:      Effort: Pulmonary effort is normal. No respiratory distress.      Breath sounds: Normal breath sounds. No stridor. No wheezing or rhonchi.   Chest:      Chest wall: No tenderness.   Abdominal:      Palpations: Abdomen is soft.   Musculoskeletal:         General: Normal range of motion.      Cervical back: Normal range of motion and neck supple.   Skin:     General: Skin is warm.      Coloration: Skin is not jaundiced or pale.      Findings: No bruising, erythema, lesion or rash.   Neurological:      General: No focal deficit present.      Mental Status: He is alert and oriented to person, place, and time.      Cranial Nerves: No cranial nerve deficit.      Sensory: No sensory deficit.      Motor: No weakness.      Coordination: Coordination normal.      Gait: Gait normal.      Deep Tendon Reflexes: Reflexes normal.   Psychiatric:         Mood and Affect: Mood normal.          Behavior: Behavior normal.         Thought Content: Thought content normal.         Judgment: Judgment normal.         Fluids    Intake/Output Summary (Last 24 hours) at 3/22/2025 1104  Last data filed at 3/22/2025 0800  Gross per 24 hour   Intake 3815.43 ml   Output 1920 ml   Net 1895.43 ml       Laboratory  Recent Labs     03/20/25  1834 03/20/25  2200   WBC 12.3* 10.0   RBC 6.25* 5.50   HEMOGLOBIN 18.5* 16.2   HEMATOCRIT 53.5* 48.7   MCV 85.6 88.5   MCH 29.6 29.5   MCHC 34.6 33.3   RDW 38.6 39.6   PLATELETCT 459* 334   MPV 9.0 8.7*     Recent Labs     03/21/25  1617 03/21/25  2259 03/22/25  0651   SODIUM 137 135 134*   POTASSIUM 3.5* 4.6 3.3*   CHLORIDE 114* 114* 107   CO2 13* 13* 15*   GLUCOSE 92 96 236*   BUN 18 16 12   CREATININE 0.74 0.62 0.61   CALCIUM 8.4* 7.9* 7.6*                   Imaging  DX-CHEST-PORTABLE (1 VIEW)   Final Result         1. No acute cardiopulmonary abnormalities are identified.           Assessment/Plan  Problem Representation:    * DKA, type 1, not at goal (HCC)- (present on admission)  Assessment & Plan  Glucose 520, bicarb 6, AG 31, BHB 8.95. New onset diabetic, A1c 13, with recent URI treated with few days of steroids. At his age, unclear whether T2 or T1DM.    -DKA protocol  -Insulin Abs: BASSEM, IAA, IA-2, C-peptide  -Prn antiemetics, pain mgmt  -NPO, ice chips ok  -Diabetes education  -Will likely need insulin on discharge until insulin Abs return  -Transition to Lantus once HCO3 >=16, anion gap closed.    Hypophosphatemia  Assessment & Plan  Replete PRN  Monitor w/ frequent labs due to concern for refeeding syndrome    Polycythemia- (present on admission)  Assessment & Plan  Likely due to hemoconcentration due to glucosuria, trend    Constipation- (present on admission)  Assessment & Plan  Bowel protocol as needed    Leukocytosis- (present on admission)  Assessment & Plan  Likely reactive, monitor    Acute kidney injury (HCC)- (present on admission)  Assessment & Plan  Baseline sCr  ~1, up to 1.49 on admission. Likely pre-renal due to glucosuria.    -Fluid resuscitate per DKA protocol  -Avoid nephrotoxins and renally dose meds    Influenza A- (present on admission)  Assessment & Plan  Patient reports subjective shortness of breath, not requiring oxygen. CXR clear. PCT 0.09. Started on Tamiflu in the ED.    -Tamiflu         VTE prophylaxis: heparin ppx    I have performed a physical exam and reviewed and updated ROS and Plan today (3/22/2025). In review of yesterday's note (3/21/2025), there are no changes except as documented above.

## 2025-03-22 NOTE — PROGRESS NOTES
4 Eyes Skin Assessment Completed by FLORIN Lara- pt transferring to Medical unit.   Head WDL  Ears Redness and Blanching, L ear, from glasses   Nose WDL  Mouth WDL  Neck WDL  Breast/Chest scar  Shoulder Blades WDL  Spine WDL  (R) Arm/Elbow/Hand Redness and Blanching  (L) Arm/Elbow/Hand Redness and Blanching  Abdomen WDL  Groin WDL  Scrotum/Coccyx/Buttocks WDL  (R) Leg WDL  (L) Leg WDL  (R) Heel/Foot/Toe old scar on inner side of ankle. Per patient, has had from previous skateboarding accident   (L) Heel/Foot/Toe WDL          Devices In Places ECG, Tele Box, Blood Pressure Cuff, Pulse Ox, Baker, and SCD's      Interventions In Place q2 turns, rest, offloading     Possible Skin Injury No    Pictures Uploaded Into Epic N/A  Wound Consult Placed N/A  RN Wound Prevention Protocol Ordered No

## 2025-03-22 NOTE — PROGRESS NOTES
2350 Patients finger stick BG resulted at 90. Notified Dr. Queen, received orders as shown on MAR. Insulin gtt to be changed to 2 units/hr, fluids to be changed to sodium bicarbonate D5W    0050 notified Dr. Queen of patients BG of 94. Sodium bicarbonate D5W not on floor, waiting on pharmacy.     0140 notified Dr. Queen of patients BG of 89. Received ordered to decrease insulin rate to 1 unit. Sodium Bicarbonate D5W delivered to floor, primed and administered.

## 2025-03-22 NOTE — PROGRESS NOTES
..4 Eyes Skin Assessment Completed by FLORIN Kelly and FLORIN Barragan.    Head WDL  Ears Redness and Blanching on left ear  Nose WDL  Mouth WDL  Neck WDL  Breast/Chest Scar center of chest from ingrown chest hair  Shoulder Blades WDL  Spine WDL  (R) Arm/Elbow/Hand WDL  (L) Arm/Elbow/Hand WDL  Abdomen WDL  Groin WDL  Scrotum/Coccyx/Buttocks WDL  (R) Leg WDL  (L) Leg WDL  (R) Heel/Foot/Toe Bruising on top of foot  (L) Heel/Foot/Toe WDL          Devices In Places ECG, Blood Pressure Cuff, and Pulse Ox      Interventions In Place TAP System, Pillows, Low Air Loss Mattress, and Pressure Redistribution Mattress    Possible Skin Injury No    Pictures Uploaded Into Epic N/A  Wound Consult Placed N/A  RN Wound Prevention Protocol Ordered No

## 2025-03-22 NOTE — ASSESSMENT & PLAN NOTE
New Dx  Pt had a hernia surgery in July and his BG at that time was>300 but he reports he wasn't told to do anything about it  A1c 13.8  Likely type 1 thought lab work up pending  Starting glargine 20units and short acting protocol  Diabetic educator consulted  Ab's sent  Qid BG  Hypoglycemia protocol in place

## 2025-03-22 NOTE — PROGRESS NOTES
Notified Dr. Queen of patients BG of 168. Received orders to continue current insulin rate, and to wait for CMP results.

## 2025-03-23 ENCOUNTER — PHARMACY VISIT (OUTPATIENT)
Dept: PHARMACY | Facility: MEDICAL CENTER | Age: 40
End: 2025-03-23
Payer: MEDICARE

## 2025-03-23 VITALS
OXYGEN SATURATION: 95 % | TEMPERATURE: 97.8 F | WEIGHT: 176.15 LBS | SYSTOLIC BLOOD PRESSURE: 124 MMHG | BODY MASS INDEX: 25.22 KG/M2 | HEART RATE: 68 BPM | DIASTOLIC BLOOD PRESSURE: 85 MMHG | HEIGHT: 70 IN | RESPIRATION RATE: 18 BRPM

## 2025-03-23 PROBLEM — D72.829 LEUKOCYTOSIS: Status: RESOLVED | Noted: 2025-03-20 | Resolved: 2025-03-23

## 2025-03-23 PROBLEM — E10.10 DKA, TYPE 1, NOT AT GOAL (HCC): Chronic | Status: RESOLVED | Noted: 2025-03-20 | Resolved: 2025-03-23

## 2025-03-23 PROBLEM — R51.9 GENERALIZED HEADACHES: Status: RESOLVED | Noted: 2025-03-21 | Resolved: 2025-03-23

## 2025-03-23 PROBLEM — J10.1 INFLUENZA A: Status: RESOLVED | Noted: 2025-03-20 | Resolved: 2025-03-23

## 2025-03-23 PROBLEM — K59.00 CONSTIPATION: Status: RESOLVED | Noted: 2025-03-20 | Resolved: 2025-03-23

## 2025-03-23 PROBLEM — E83.39 HYPOPHOSPHATEMIA: Status: RESOLVED | Noted: 2025-03-22 | Resolved: 2025-03-23

## 2025-03-23 LAB
ANION GAP SERPL CALC-SCNC: 10 MMOL/L (ref 7–16)
BUN SERPL-MCNC: 10 MG/DL (ref 8–22)
CALCIUM SERPL-MCNC: 8.3 MG/DL (ref 8.5–10.5)
CHLORIDE SERPL-SCNC: 110 MMOL/L (ref 96–112)
CO2 SERPL-SCNC: 23 MMOL/L (ref 20–33)
CREAT SERPL-MCNC: 0.62 MG/DL (ref 0.5–1.4)
GFR SERPLBLD CREATININE-BSD FMLA CKD-EPI: 123 ML/MIN/1.73 M 2
GLUCOSE BLD STRIP.AUTO-MCNC: 184 MG/DL (ref 65–99)
GLUCOSE BLD STRIP.AUTO-MCNC: 257 MG/DL (ref 65–99)
GLUCOSE SERPL-MCNC: 162 MG/DL (ref 65–99)
POTASSIUM SERPL-SCNC: 3.1 MMOL/L (ref 3.6–5.5)
SODIUM SERPL-SCNC: 143 MMOL/L (ref 135–145)

## 2025-03-23 PROCEDURE — 99239 HOSP IP/OBS DSCHRG MGMT >30: CPT | Performed by: FAMILY MEDICINE

## 2025-03-23 PROCEDURE — 36415 COLL VENOUS BLD VENIPUNCTURE: CPT

## 2025-03-23 PROCEDURE — 82962 GLUCOSE BLOOD TEST: CPT

## 2025-03-23 PROCEDURE — A9270 NON-COVERED ITEM OR SERVICE: HCPCS

## 2025-03-23 PROCEDURE — RXMED WILLOW AMBULATORY MEDICATION CHARGE: Performed by: FAMILY MEDICINE

## 2025-03-23 PROCEDURE — 700102 HCHG RX REV CODE 250 W/ 637 OVERRIDE(OP)

## 2025-03-23 PROCEDURE — 700111 HCHG RX REV CODE 636 W/ 250 OVERRIDE (IP)

## 2025-03-23 PROCEDURE — 80048 BASIC METABOLIC PNL TOTAL CA: CPT

## 2025-03-23 RX ORDER — AVOBENZONE, HOMOSALATE, OCTISALATE, OCTOCRYLENE 30; 40; 45; 26 MG/ML; MG/ML; MG/ML; MG/ML
CREAM TOPICAL
Qty: 100 EACH | Refills: 0 | Status: SHIPPED | OUTPATIENT
Start: 2025-03-23

## 2025-03-23 RX ORDER — POTASSIUM CHLORIDE 1500 MG/1
40 TABLET, EXTENDED RELEASE ORAL ONCE
Status: COMPLETED | OUTPATIENT
Start: 2025-03-23 | End: 2025-03-23

## 2025-03-23 RX ORDER — INSULIN ASPART 100 [IU]/ML
INJECTION, SOLUTION INTRAVENOUS; SUBCUTANEOUS
Qty: 10 ML | Refills: 1 | Status: ACTIVE | OUTPATIENT
Start: 2025-03-23

## 2025-03-23 RX ORDER — GLUCOSAMINE HCL/CHONDROITIN SU 500-400 MG
CAPSULE ORAL
Qty: 100 EACH | Refills: 0 | Status: SHIPPED | OUTPATIENT
Start: 2025-03-23

## 2025-03-23 RX ORDER — SYRINGE-NEEDLE,INSULIN,0.5 ML 27GX1/2"
1 SYRINGE, EMPTY DISPOSABLE MISCELLANEOUS
Qty: 100 EACH | Refills: 3 | Status: SHIPPED | OUTPATIENT
Start: 2025-03-23

## 2025-03-23 RX ORDER — INSULIN GLARGINE 100 [IU]/ML
20 INJECTION, SOLUTION SUBCUTANEOUS EVERY EVENING
Qty: 10 ML | Refills: 1 | Status: ACTIVE | OUTPATIENT
Start: 2025-03-23 | End: 2025-04-22

## 2025-03-23 RX ADMIN — INSULIN LISPRO 3 UNITS: 100 INJECTION, SOLUTION INTRAVENOUS; SUBCUTANEOUS at 08:05

## 2025-03-23 RX ADMIN — THIAMINE HYDROCHLORIDE 200 MG: 100 INJECTION, SOLUTION INTRAMUSCULAR; INTRAVENOUS at 05:18

## 2025-03-23 RX ADMIN — POTASSIUM CHLORIDE 40 MEQ: 1500 TABLET, EXTENDED RELEASE ORAL at 05:17

## 2025-03-23 RX ADMIN — OSELTAMIVIR PHOSPHATE 75 MG: 75 CAPSULE ORAL at 05:17

## 2025-03-23 RX ADMIN — INSULIN LISPRO 7 UNITS: 100 INJECTION, SOLUTION INTRAVENOUS; SUBCUTANEOUS at 12:36

## 2025-03-23 NOTE — DISCHARGE SUMMARY
Discharge Summary    CHIEF COMPLAINT ON ADMISSION  Chief Complaint   Patient presents with    Cough    N/V    Abdominal Pain    Constipation    Headache    Tired       Reason for Admission  Flu Like Symptoms     Admission Date  3/20/2025    CODE STATUS  Full Code    HPI & HOSPITAL COURSE  39 y.o. male who presented 3/20/2025 with no previous Hx of DM who presented with malaise, N/V.  In the ED influenza A pos, , HCO3 6, AG 31, BHB 8.95.  Admitted to the ICU with a new Dx of DM and DKA.     Patient's DKA resolved and he was transferred out of the ICU to the IMCU and then to the med floor where he continued to improve.    Pt has been doing markedly better.  Likely type 1 dM.  Pts dka resolved, patient will get diabetic education teaching at bedside regarding insulin pens, blood glucometer, needles etc.  Patient is high performing high cognitive patient who will understand this.  Patient can follow-up with endocrinology outpatient.  This will be arranged for him.  Patient can also follow-up with his labs for his insulin antibodies.    Patient seen and examined today he is afebrile vitals are stable patient is stable for discharge home.    Therefore, he is discharged in good and stable condition to home with close outpatient follow-up.    The patient met 2-midnight criteria for an inpatient stay at the time of discharge.    Discharge Date  3/23/2025    FOLLOW UP ITEMS POST DISCHARGE  Endocrinology    DISCHARGE DIAGNOSES  Principal Problem (Resolved):    DKA, type 1, not at goal (HCC) (Chronic) (POA: Yes)  Active Problems:    Acute kidney injury (HCC) (POA: Yes)    Polycythemia (POA: Yes)  Resolved Problems:    Influenza A (POA: Yes)    Leukocytosis (POA: Yes)    Constipation (POA: Yes)    Generalized headaches (POA: Unknown)    Hypophosphatemia (POA: Unknown)      FOLLOW UP  No future appointments.  No follow-up provider specified.    MEDICATIONS ON DISCHARGE     Medication List        START taking these  medications        Instructions   Alcohol Swabs   Doctor's comments: Per formulary preference. ICD-10 code: E10.65 - Uncontrolled type 1 Diabetes Mellitus  Wipe site with prep pad prior to injection.     * Blood Glucose Meter Kit   Doctor's comments: Or per formulary preference. ICD-10 code: E10.65 - Uncontrolled type 1 Diabetes Mellitus  Test blood sugar as recommended by provider. Which   ever blood glucose monitoring kit.     * Blood Glucose Test Strips   Doctor's comments: Or per formulary preference. ICD-10 code: E10.9 - unControlled type 1 Diabetes Mellitus  Use one one strip to test blood sugar three times daily before meals.     insulin lispro 100 UNIT/ML Sopn injection PEN  Commonly known as: HumaLOG/AdmeLOG   Blood Glucose: 70 - 150 mg/dL = 0 Units 151 - 200 mg/dL = 3 Units 201 - 250 mg/dL = 4 Units 251 - 300 mg/dL = 7 Units 301 - 350 mg/dL = 10 Units 351 - 400 mg/dL = 12 Units Over 400 mg/dL = 14 Units Over 400 mg/dL x 2 consecutive FSBG = 14 Units and call MD     Insulin Pen Needle 31G X 5 MM Misc   Doctor's comments: Use a new needle each time you inject  1 Needle 3 times a day before meals.  Dose: 1 Needle     Lancets   Doctor's comments: Or per formulary preference. ICD-10 code: E10.65 - Uncontrolled type 1 Diabetes Mellitus  Use one  lancet to test blood sugar three times daily before meals.     Lantus SoloStar 100 UNIT/ML Sopn injection  Generic drug: insulin glargine   Inject 20 Units under the skin every evening for 30 days.  Dose: 20 Units           * This list has 2 medication(s) that are the same as other medications prescribed for you. Read the directions carefully, and ask your doctor or other care provider to review them with you.                STOP taking these medications      brompheniramine-pseudoephedrine-DM 30-2-10 MG/5ML syrup     doxycycline 100 MG Caps  Commonly known as: Vibramycin     predniSONE 20 MG Tabs  Commonly known as: Deltasone              Allergies  No Known  Allergies    DIET  Orders Placed This Encounter   Procedures    Diet Order Diet: Consistent CHO (Diabetic)     Standing Status:   Standing     Number of Occurrences:   1     Diet::   Consistent CHO (Diabetic) [4]       ACTIVITY  As tolerated.  Weight bearing as tolerated    CONSULTATIONS  None    PROCEDURES  None    LABORATORY  Lab Results   Component Value Date    SODIUM 143 03/23/2025    POTASSIUM 3.1 (L) 03/23/2025    CHLORIDE 110 03/23/2025    CO2 23 03/23/2025    GLUCOSE 162 (H) 03/23/2025    BUN 10 03/23/2025    CREATININE 0.62 03/23/2025        Lab Results   Component Value Date    WBC 10.0 03/20/2025    HEMOGLOBIN 16.2 03/20/2025    HEMATOCRIT 48.7 03/20/2025    PLATELETCT 334 03/20/2025        Total time of the discharge process exceeds 35 minutes.

## 2025-03-23 NOTE — PROGRESS NOTES
Diabetic Education given to patient with return demonstration. Patient stated understanding and given education printouts. IV removed per order. Patient educated on calling to schedule follow up appt and endocrinologist appt

## 2025-03-23 NOTE — PROGRESS NOTES
Pt to S600 with all belongings. Report given to receiving RN. Updated on POC. All needs met. Patient transferred.

## 2025-03-23 NOTE — CARE PLAN
The patient is Stable - Low risk of patient condition declining or worsening    Shift Goals  Clinical Goals: Monitor BG and labs  Patient Goals: rest  Family Goals: eladia    Progress made toward(s) clinical / shift goals: Patient is alert and oriented x4. Updated on POC and verbalized understanding. On room air. Blood sugar checked and documented. Medicated per MAR and tolerated well. OOB to the bathroom tolerated well. Fall precautions and hourly rounding in place. Needs attended.    Patient is not progressing towards the following goals:    Problem: Diabetes Management - DKA  Goal: Patient will achieve and maintain glucose in satisfactory range  Description: 1.  Assess precipitating factors such as infection, new-onset diabetes, or poor compliance with treatment regimen2.  Monitor glucose levels per provider order3.  Assess for signs and symptoms of hyperglycemia (abdominal pain, bloating, nausea or vomitting)4.  Assess for signs and symptoms of hypoglycemia (anxiety, tremors, slurred speech, change in LOC, tachycardia, fatigue)5.  Monitor for early signs and symptoms of infection6.  Monitor daily weights7.  Consult to diabetes educator  Outcome: Not Progressing

## 2025-03-23 NOTE — PROGRESS NOTES
[facility]           Patient: Michael Vázquez   YOB: 1985   Date of Visit:  3/23/2025       To Whom It May Concern:    Michael Vázquez was seen and treated in our Medical department on 3/20/2025 to 3/23/2025. He is able to return to work on Wednesday 3/26/2025 with no restrictions.    Sincerely,     MD Jalen Pringle  [place] [address]  Dept: [phone]

## 2025-03-25 LAB
GAD65 AB SER IA-ACNC: <5 IU/ML (ref 0–5)
INSULIN HUMAN AB SER-ACNC: <0.4 U/ML (ref 0–0.4)
INSULIN HUMAN AB SER-ACNC: <0.4 U/ML (ref 0–0.4)
ISLET CELL512 AB SER IA-ACNC: <5.4 U/ML (ref 0–7.4)
PANC ISLET CELL AB TITR SER: NORMAL {TITER}

## 2025-08-13 DIAGNOSIS — Z00.6 CLINICAL TRIAL PARTICIPANT: ICD-10-CM

## 2025-09-04 ENCOUNTER — APPOINTMENT (OUTPATIENT)
Dept: LAB | Facility: MEDICAL CENTER | Age: 40
End: 2025-09-04
Attending: FAMILY MEDICINE

## (undated) DEVICE — SLEEVE, VASO, THIGH, MED

## (undated) DEVICE — BLADE SURGICAL #15 - (50/BX 3BX/CA)

## (undated) DEVICE — TOWEL STOP TIMEOUT SAFETY FLAG (40EA/CA)

## (undated) DEVICE — SUTURE 2-0 20CM STRATAFIX SPIRAL SH NEEDLE (12/BX)

## (undated) DEVICE — GOWN WARMING STANDARD FLEX - (30/CA)

## (undated) DEVICE — SODIUM CHL IRRIGATION 0.9% 1000ML (12EA/CA)

## (undated) DEVICE — SYSTEM CLEARIFY VISUALIZATION (10EA/PK)

## (undated) DEVICE — COVER TIP ENDOWRIST HOT SHEAR - (10EA/BX) DA VINCI

## (undated) DEVICE — GLOVE SZ 7.5 LF PROTEXIS (50PR/BX)

## (undated) DEVICE — SUTURE 4-0 MONOCRYL PLUS PS-2 - 27 INCH (36/BX)

## (undated) DEVICE — DRAPE COLUMN  BOX OF 20

## (undated) DEVICE — OBTURATOR BLADELESS STANDARD 8MM (6EA/BX)

## (undated) DEVICE — DRAPE ABDOMINAL STERILE LAPAROSCOPIC 102IN X 121IN 77IN (12EA/CA)

## (undated) DEVICE — SUTURE GENERAL

## (undated) DEVICE — Device

## (undated) DEVICE — DERMABOND ADVANCED - (12EA/BX)

## (undated) DEVICE — TUBE CONNECTING SUCTION - CLEAR PLASTIC STERILE 72 IN (50EA/CA)

## (undated) DEVICE — CATHETER IV 18 GA X 1-3/4 ---SURG.& SDS ONLY---

## (undated) DEVICE — DRAPE ARM  BOX OF 20

## (undated) DEVICE — TROCAR 5X100 NON BLADED Z-TH - READ KII (6/BX)

## (undated) DEVICE — GLOVE BIOGEL PI INDICATOR SZ 8.0 SURGICAL PF LF -(50/BX 4BX/CA)